# Patient Record
Sex: MALE | Race: WHITE | NOT HISPANIC OR LATINO | Employment: OTHER | URBAN - METROPOLITAN AREA
[De-identification: names, ages, dates, MRNs, and addresses within clinical notes are randomized per-mention and may not be internally consistent; named-entity substitution may affect disease eponyms.]

---

## 2017-05-23 ENCOUNTER — HISTORICAL (OUTPATIENT)
Dept: ADMINISTRATIVE | Facility: HOSPITAL | Age: 62
End: 2017-05-23

## 2017-11-21 ENCOUNTER — HISTORICAL (OUTPATIENT)
Dept: ADMINISTRATIVE | Facility: HOSPITAL | Age: 62
End: 2017-11-21

## 2017-12-12 ENCOUNTER — HISTORICAL (OUTPATIENT)
Dept: ADMINISTRATIVE | Facility: HOSPITAL | Age: 62
End: 2017-12-12

## 2017-12-12 ENCOUNTER — HISTORICAL (OUTPATIENT)
Dept: LAB | Facility: HOSPITAL | Age: 62
End: 2017-12-12

## 2017-12-13 LAB — GRAM STN SPEC: NORMAL

## 2017-12-17 LAB — FINAL CULTURE: NORMAL

## 2018-01-05 ENCOUNTER — HISTORICAL (OUTPATIENT)
Dept: ADMINISTRATIVE | Facility: HOSPITAL | Age: 63
End: 2018-01-05

## 2018-01-05 LAB
ALBUMIN SERPL-MCNC: 3.4 GM/DL (ref 3.4–5)
ALBUMIN/GLOB SERPL: 0.9 RATIO (ref 1.1–2)
ALP SERPL-CCNC: 93 UNIT/L (ref 50–136)
ALT SERPL-CCNC: 51 UNIT/L (ref 12–78)
AST SERPL-CCNC: 45 UNIT/L (ref 15–37)
BILIRUB SERPL-MCNC: 0.7 MG/DL (ref 0.2–1)
BILIRUBIN DIRECT+TOT PNL SERPL-MCNC: 0.2 MG/DL (ref 0–0.5)
BILIRUBIN DIRECT+TOT PNL SERPL-MCNC: 0.5 MG/DL (ref 0–0.8)
BUN SERPL-MCNC: 8 MG/DL (ref 7–18)
CALCIUM SERPL-MCNC: 9.2 MG/DL (ref 8.5–10.1)
CHLORIDE SERPL-SCNC: 105 MMOL/L (ref 98–107)
CHOLEST SERPL-MCNC: 131 MG/DL (ref 0–200)
CHOLEST/HDLC SERPL: 2.7 {RATIO} (ref 0–5)
CO2 SERPL-SCNC: 26 MMOL/L (ref 21–32)
CREAT SERPL-MCNC: 0.7 MG/DL (ref 0.7–1.3)
GLOBULIN SER-MCNC: 3.6 GM/DL (ref 2.4–3.5)
GLUCOSE SERPL-MCNC: 95 MG/DL (ref 74–106)
HDLC SERPL-MCNC: 49 MG/DL (ref 35–60)
LDLC SERPL CALC-MCNC: 59 MG/DL (ref 0–129)
POTASSIUM SERPL-SCNC: 4.3 MMOL/L (ref 3.5–5.1)
PROT SERPL-MCNC: 7 GM/DL (ref 6.4–8.2)
SODIUM SERPL-SCNC: 138 MMOL/L (ref 136–145)
TRIGL SERPL-MCNC: 115 MG/DL (ref 30–150)
VLDLC SERPL CALC-MCNC: 23 MG/DL

## 2018-03-14 ENCOUNTER — HISTORICAL (OUTPATIENT)
Dept: ADMINISTRATIVE | Facility: HOSPITAL | Age: 63
End: 2018-03-14

## 2018-03-14 LAB
ABS NEUT (OLG): 3.18 X10(3)/MCL (ref 2.1–9.2)
APPEARANCE, UA: CLEAR
APTT PPP: 28.6 SECOND(S) (ref 24.8–36.9)
BACTERIA SPEC CULT: ABNORMAL /HPF
BASOPHILS # BLD AUTO: 0 X10(3)/MCL (ref 0–0.2)
BASOPHILS NFR BLD AUTO: 1 %
BILIRUB UR QL STRIP: ABNORMAL
BUN SERPL-MCNC: 11 MG/DL (ref 7–18)
CALCIUM SERPL-MCNC: 9.7 MG/DL (ref 8.5–10.1)
CHLORIDE SERPL-SCNC: 102 MMOL/L (ref 98–107)
CO2 SERPL-SCNC: 26 MMOL/L (ref 21–32)
COLOR UR: ABNORMAL
CREAT SERPL-MCNC: 0.74 MG/DL (ref 0.7–1.3)
CREAT/UREA NIT SERPL: 14.9
EOSINOPHIL # BLD AUTO: 0.7 X10(3)/MCL (ref 0–0.9)
EOSINOPHIL NFR BLD AUTO: 12 %
ERYTHROCYTE [DISTWIDTH] IN BLOOD BY AUTOMATED COUNT: 14.4 % (ref 11.5–17)
GLUCOSE (UA): NEGATIVE
GLUCOSE SERPL-MCNC: 94 MG/DL (ref 74–106)
HCT VFR BLD AUTO: 41.1 % (ref 42–52)
HGB BLD-MCNC: 13.9 GM/DL (ref 14–18)
HGB UR QL STRIP: ABNORMAL
INR PPP: 1.09 (ref 0–1.27)
KETONES UR QL STRIP: ABNORMAL
LEUKOCYTE ESTERASE UR QL STRIP: NEGATIVE
LYMPHOCYTES # BLD AUTO: 1.3 X10(3)/MCL (ref 0.6–4.6)
LYMPHOCYTES NFR BLD AUTO: 22 %
MCH RBC QN AUTO: 32.9 PG (ref 27–31)
MCHC RBC AUTO-ENTMCNC: 33.8 GM/DL (ref 33–36)
MCV RBC AUTO: 97.2 FL (ref 80–94)
MONOCYTES # BLD AUTO: 0.8 X10(3)/MCL (ref 0.1–1.3)
MONOCYTES NFR BLD AUTO: 13 %
NEUTROPHILS # BLD AUTO: 3.18 X10(3)/MCL (ref 2.1–9.2)
NEUTROPHILS NFR BLD AUTO: 53 %
NITRITE UR QL STRIP: NEGATIVE
PH UR STRIP: 6 [PH] (ref 5–9)
PLATELET # BLD AUTO: 235 X10(3)/MCL (ref 130–400)
PMV BLD AUTO: 10.4 FL (ref 9.4–12.4)
POTASSIUM SERPL-SCNC: 4.2 MMOL/L (ref 3.5–5.1)
PROT UR QL STRIP: ABNORMAL
PROTHROMBIN TIME: 14.5 SECOND(S) (ref 12.2–14.7)
RBC # BLD AUTO: 4.23 X10(6)/MCL (ref 4.7–6.1)
RBC #/AREA URNS HPF: ABNORMAL /[HPF]
SODIUM SERPL-SCNC: 137 MMOL/L (ref 136–145)
SP GR UR STRIP: 1.02 (ref 1–1.03)
SQUAMOUS EPITHELIAL, UA: ABNORMAL
UROBILINOGEN UR STRIP-ACNC: 0.2
WBC # SPEC AUTO: 6 X10(3)/MCL (ref 4.5–11.5)
WBC #/AREA URNS HPF: ABNORMAL /HPF

## 2018-04-10 ENCOUNTER — HISTORICAL (OUTPATIENT)
Dept: ADMINISTRATIVE | Facility: HOSPITAL | Age: 63
End: 2018-04-10

## 2018-06-19 ENCOUNTER — HISTORICAL (OUTPATIENT)
Dept: ADMINISTRATIVE | Facility: HOSPITAL | Age: 63
End: 2018-06-19

## 2018-06-19 LAB
ABS NEUT (OLG): 3.47 X10(3)/MCL (ref 2.1–9.2)
ALBUMIN SERPL-MCNC: 3.7 GM/DL (ref 3.4–5)
ALBUMIN/GLOB SERPL: 0.9 {RATIO}
ALP SERPL-CCNC: 140 UNIT/L (ref 50–136)
ALT SERPL-CCNC: 37 UNIT/L (ref 12–78)
APPEARANCE, UA: CLEAR
APTT PPP: 28.3 SECOND(S) (ref 24.8–36.9)
AST SERPL-CCNC: 33 UNIT/L (ref 15–37)
BACTERIA SPEC CULT: ABNORMAL /HPF
BASOPHILS # BLD AUTO: 0.1 X10(3)/MCL (ref 0–0.2)
BASOPHILS NFR BLD AUTO: 1 %
BILIRUB SERPL-MCNC: 0.6 MG/DL (ref 0.2–1)
BILIRUB UR QL STRIP: NEGATIVE
BILIRUBIN DIRECT+TOT PNL SERPL-MCNC: 0.2 MG/DL (ref 0–0.2)
BILIRUBIN DIRECT+TOT PNL SERPL-MCNC: 0.4 MG/DL (ref 0–0.8)
BUN SERPL-MCNC: 19 MG/DL (ref 7–18)
CALCIUM SERPL-MCNC: 9.4 MG/DL (ref 8.5–10.1)
CHLORIDE SERPL-SCNC: 102 MMOL/L (ref 98–107)
CO2 SERPL-SCNC: 28 MMOL/L (ref 21–32)
COLOR UR: YELLOW
CREAT SERPL-MCNC: 1.05 MG/DL (ref 0.7–1.3)
EOSINOPHIL # BLD AUTO: 0.6 X10(3)/MCL (ref 0–0.9)
EOSINOPHIL NFR BLD AUTO: 10 %
ERYTHROCYTE [DISTWIDTH] IN BLOOD BY AUTOMATED COUNT: 13.2 % (ref 11.5–17)
GLOBULIN SER-MCNC: 4.3 GM/DL (ref 2.4–3.5)
GLUCOSE (UA): NEGATIVE
GLUCOSE SERPL-MCNC: 106 MG/DL (ref 74–106)
HCT VFR BLD AUTO: 36.6 % (ref 42–52)
HGB BLD-MCNC: 12.4 GM/DL (ref 14–18)
HGB UR QL STRIP: NEGATIVE
INR PPP: 1.06 (ref 0–1.27)
KETONES UR QL STRIP: NEGATIVE
LEUKOCYTE ESTERASE UR QL STRIP: NEGATIVE
LYMPHOCYTES # BLD AUTO: 1.1 X10(3)/MCL (ref 0.6–4.6)
LYMPHOCYTES NFR BLD AUTO: 19 %
MCH RBC QN AUTO: 33.2 PG (ref 27–31)
MCHC RBC AUTO-ENTMCNC: 33.9 GM/DL (ref 33–36)
MCV RBC AUTO: 98.1 FL (ref 80–94)
MONOCYTES # BLD AUTO: 0.7 X10(3)/MCL (ref 0.1–1.3)
MONOCYTES NFR BLD AUTO: 11 %
NEUTROPHILS # BLD AUTO: 3.47 X10(3)/MCL (ref 1.4–7.9)
NEUTROPHILS NFR BLD AUTO: 58 %
NITRITE UR QL STRIP: NEGATIVE
PH UR STRIP: 6 [PH] (ref 5–9)
PLATELET # BLD AUTO: 265 X10(3)/MCL (ref 130–400)
PMV BLD AUTO: 9.2 FL (ref 9.4–12.4)
POTASSIUM SERPL-SCNC: 5.1 MMOL/L (ref 3.5–5.1)
PROT SERPL-MCNC: 8 GM/DL (ref 6.4–8.2)
PROT UR QL STRIP: ABNORMAL
PROTHROMBIN TIME: 14.1 SECOND(S) (ref 12.2–14.7)
RBC # BLD AUTO: 3.73 X10(6)/MCL (ref 4.7–6.1)
RBC #/AREA URNS HPF: ABNORMAL /[HPF]
SODIUM SERPL-SCNC: 140 MMOL/L (ref 136–145)
SP GR UR STRIP: 1.01 (ref 1–1.03)
SQUAMOUS EPITHELIAL, UA: ABNORMAL
UROBILINOGEN UR STRIP-ACNC: 0.2
WBC # SPEC AUTO: 6 X10(3)/MCL (ref 4.5–11.5)
WBC #/AREA URNS HPF: ABNORMAL /HPF

## 2018-08-02 ENCOUNTER — HISTORICAL (OUTPATIENT)
Dept: ADMINISTRATIVE | Facility: HOSPITAL | Age: 63
End: 2018-08-02

## 2018-12-10 ENCOUNTER — HISTORICAL (OUTPATIENT)
Dept: LAB | Facility: HOSPITAL | Age: 63
End: 2018-12-10

## 2018-12-13 LAB — FINAL CULTURE: NORMAL

## 2019-01-28 ENCOUNTER — HISTORICAL (OUTPATIENT)
Dept: ADMINISTRATIVE | Facility: HOSPITAL | Age: 64
End: 2019-01-28

## 2019-01-28 LAB
ABS NEUT (OLG): 2.45 X10(3)/MCL (ref 2.1–9.2)
ALBUMIN SERPL-MCNC: 3.7 GM/DL (ref 3.4–5)
ALBUMIN/GLOB SERPL: 0.9 RATIO (ref 1.1–2)
ALP SERPL-CCNC: 120 UNIT/L (ref 50–136)
ALT SERPL-CCNC: 62 UNIT/L (ref 12–78)
ANISOCYTOSIS BLD QL SMEAR: 0
APPEARANCE, UA: CLEAR
APTT PPP: 30.7 SECOND(S) (ref 24.8–36.9)
AST SERPL-CCNC: 51 UNIT/L (ref 15–37)
BACTERIA SPEC CULT: NORMAL /HPF
BILIRUB SERPL-MCNC: 0.5 MG/DL (ref 0.2–1)
BILIRUB UR QL STRIP: NEGATIVE
BILIRUBIN DIRECT+TOT PNL SERPL-MCNC: 0.1 MG/DL (ref 0–0.5)
BILIRUBIN DIRECT+TOT PNL SERPL-MCNC: 0.4 MG/DL (ref 0–0.8)
BUN SERPL-MCNC: 35 MG/DL (ref 7–18)
CALCIUM SERPL-MCNC: 9.6 MG/DL (ref 8.5–10.1)
CHLORIDE SERPL-SCNC: 105 MMOL/L (ref 98–107)
CHOLEST SERPL-MCNC: 166 MG/DL (ref 0–200)
CHOLEST/HDLC SERPL: 2.4 {RATIO} (ref 0–5)
CO2 SERPL-SCNC: 25 MMOL/L (ref 21–32)
COLOR UR: YELLOW
CREAT SERPL-MCNC: 1.43 MG/DL (ref 0.7–1.3)
EOSINOPHIL NFR BLD MANUAL: 11 % (ref 0–8)
ERYTHROCYTE [DISTWIDTH] IN BLOOD BY AUTOMATED COUNT: 11.9 % (ref 11.5–17)
GLOBULIN SER-MCNC: 4.2 GM/DL (ref 2.4–3.5)
GLUCOSE (UA): NEGATIVE
GLUCOSE SERPL-MCNC: 107 MG/DL (ref 74–106)
HCT VFR BLD AUTO: 35.2 % (ref 42–52)
HDLC SERPL-MCNC: 68 MG/DL (ref 35–60)
HGB BLD-MCNC: 11.6 GM/DL (ref 14–18)
HGB UR QL STRIP: NEGATIVE
HYPOCHROMIA BLD QL SMEAR: 0
INR PPP: 1.1 (ref 0–1.3)
KETONES UR QL STRIP: NEGATIVE
LDLC SERPL CALC-MCNC: 75 MG/DL (ref 0–129)
LEUKOCYTE ESTERASE UR QL STRIP: NEGATIVE
LYMPHOCYTES NFR BLD MANUAL: 13 % (ref 13–40)
MACROCYTES BLD QL SMEAR: 0
MCH RBC QN AUTO: 33.3 PG (ref 27–31)
MCHC RBC AUTO-ENTMCNC: 33 GM/DL (ref 33–36)
MCV RBC AUTO: 101.1 FL (ref 80–94)
MICROCYTES BLD QL SMEAR: 0
MONOCYTES NFR BLD MANUAL: 10 % (ref 2–11)
NEUTROPHILS NFR BLD MANUAL: 65 % (ref 47–80)
NITRITE UR QL STRIP: NEGATIVE
PH UR STRIP: 5 [PH] (ref 5–9)
PLATELET # BLD AUTO: 227 X10(3)/MCL (ref 130–400)
PLATELET # BLD EST: NORMAL 10*3/UL
PMV BLD AUTO: 9.7 FL (ref 7.4–10.4)
POIKILOCYTOSIS BLD QL SMEAR: 0
POLYCHROMASIA BLD QL SMEAR: 0
POTASSIUM SERPL-SCNC: 4.9 MMOL/L (ref 3.5–5.1)
PROT SERPL-MCNC: 7.9 GM/DL (ref 6.4–8.2)
PROT UR QL STRIP: NEGATIVE
PROTHROMBIN TIME: 14.7 SECOND(S) (ref 12.2–14.7)
PSA SERPL-MCNC: 1.47 NG/ML (ref 0–4)
RBC # BLD AUTO: 3.48 X10(6)/MCL (ref 4.7–6.1)
RBC #/AREA URNS HPF: NORMAL /[HPF]
SODIUM SERPL-SCNC: 137 MMOL/L (ref 136–145)
SP GR UR STRIP: 1.01 (ref 1–1.03)
SQUAMOUS EPITHELIAL, UA: NORMAL
TRIGL SERPL-MCNC: 115 MG/DL (ref 30–150)
UROBILINOGEN UR STRIP-ACNC: 0.2
VLDLC SERPL CALC-MCNC: 23 MG/DL
WBC # SPEC AUTO: 5.1 X10(3)/MCL (ref 4.5–11.5)
WBC #/AREA URNS HPF: NORMAL /HPF

## 2019-01-30 ENCOUNTER — HISTORICAL (OUTPATIENT)
Dept: ADMINISTRATIVE | Facility: HOSPITAL | Age: 64
End: 2019-01-30

## 2019-01-30 LAB
BUN SERPL-MCNC: 32 MG/DL (ref 7–18)
CALCIUM SERPL-MCNC: 8.8 MG/DL (ref 8.5–10.1)
CHLORIDE SERPL-SCNC: 102 MMOL/L (ref 98–107)
CO2 SERPL-SCNC: 24 MMOL/L (ref 21–32)
CREAT SERPL-MCNC: 1.31 MG/DL (ref 0.7–1.3)
CREAT/UREA NIT SERPL: 24.4
GLUCOSE SERPL-MCNC: 128 MG/DL (ref 74–106)
POTASSIUM SERPL-SCNC: 4.2 MMOL/L (ref 3.5–5.1)
SODIUM SERPL-SCNC: 134 MMOL/L (ref 136–145)

## 2019-04-23 ENCOUNTER — HISTORICAL (OUTPATIENT)
Dept: LAB | Facility: HOSPITAL | Age: 64
End: 2019-04-23

## 2019-04-23 LAB
ABS NEUT (OLG): 3.63 X10(3)/MCL (ref 2.1–9.2)
ALBUMIN SERPL-MCNC: 4.3 GM/DL (ref 3.4–5)
ALBUMIN/GLOB SERPL: 0.9 RATIO (ref 1.1–2)
ALP SERPL-CCNC: 157 UNIT/L (ref 50–136)
ALT SERPL-CCNC: 57 UNIT/L (ref 12–78)
APPEARANCE, UA: CLEAR
AST SERPL-CCNC: 59 UNIT/L (ref 15–37)
BACTERIA SPEC CULT: ABNORMAL /HPF
BASOPHILS NFR BLD MANUAL: 1 % (ref 0–2)
BILIRUB SERPL-MCNC: 0.6 MG/DL (ref 0.2–1)
BILIRUB UR QL STRIP: NEGATIVE
BILIRUBIN DIRECT+TOT PNL SERPL-MCNC: 0.2 MG/DL (ref 0–0.5)
BILIRUBIN DIRECT+TOT PNL SERPL-MCNC: 0.4 MG/DL (ref 0–0.8)
BUN SERPL-MCNC: 25 MG/DL (ref 7–18)
CALCIUM SERPL-MCNC: 9.6 MG/DL (ref 8.5–10.1)
CHLORIDE SERPL-SCNC: 102 MMOL/L (ref 98–107)
CO2 SERPL-SCNC: 26 MMOL/L (ref 21–32)
COLOR UR: YELLOW
CREAT SERPL-MCNC: 1.19 MG/DL (ref 0.7–1.3)
EOSINOPHIL NFR BLD MANUAL: 13 % (ref 0–8)
ERYTHROCYTE [DISTWIDTH] IN BLOOD BY AUTOMATED COUNT: 15.2 % (ref 11.5–17)
GLOBULIN SER-MCNC: 4.7 GM/DL (ref 2.4–3.5)
GLUCOSE (UA): NEGATIVE
GLUCOSE SERPL-MCNC: 84 MG/DL (ref 74–106)
HCT VFR BLD AUTO: 39.5 % (ref 42–52)
HGB BLD-MCNC: 12.3 GM/DL (ref 14–18)
HGB UR QL STRIP: NEGATIVE
KETONES UR QL STRIP: NEGATIVE
LEUKOCYTE ESTERASE UR QL STRIP: NEGATIVE
LYMPHOCYTES NFR BLD MANUAL: 13 % (ref 13–40)
MCH RBC QN AUTO: 30.9 PG (ref 27–31)
MCHC RBC AUTO-ENTMCNC: 31.1 GM/DL (ref 33–36)
MCV RBC AUTO: 99.2 FL (ref 80–94)
MONOCYTES NFR BLD MANUAL: 14 % (ref 2–11)
NEUTROPHILS NFR BLD MANUAL: 59 % (ref 47–80)
NITRITE UR QL STRIP: NEGATIVE
PH UR STRIP: 5.5 [PH] (ref 5–9)
PLATELET # BLD AUTO: 308 X10(3)/MCL (ref 130–400)
PLATELET # BLD EST: NORMAL 10*3/UL
PMV BLD AUTO: 10.5 FL (ref 7.4–10.4)
POTASSIUM SERPL-SCNC: 4.7 MMOL/L (ref 3.5–5.1)
PROT SERPL-MCNC: 9 GM/DL (ref 6.4–8.2)
PROT UR QL STRIP: ABNORMAL
RBC # BLD AUTO: 3.98 X10(6)/MCL (ref 4.7–6.1)
RBC #/AREA URNS HPF: ABNORMAL /[HPF]
SODIUM SERPL-SCNC: 135 MMOL/L (ref 136–145)
SP GR UR STRIP: 1.01 (ref 1–1.03)
SQUAMOUS EPITHELIAL, UA: ABNORMAL
TSH SERPL-ACNC: 1.43 MIU/L (ref 0.36–3.74)
UROBILINOGEN UR STRIP-ACNC: 0.2
WBC # SPEC AUTO: 6.9 X10(3)/MCL (ref 4.5–11.5)
WBC #/AREA URNS HPF: ABNORMAL /HPF

## 2019-04-26 ENCOUNTER — HISTORICAL (OUTPATIENT)
Dept: ADMINISTRATIVE | Facility: HOSPITAL | Age: 64
End: 2019-04-26

## 2019-04-26 LAB
D DIMER PPP IA.FEU-MCNC: 2101 NG/ML FEU (ref 0–500)
HAV IGM SERPL QL IA: NEGATIVE
HBV CORE IGM SERPL QL IA: NEGATIVE
HBV SURFACE AG SERPL QL IA: NEGATIVE
HCV AB SERPL QL IA: NEGATIVE
HEPATITIS PANEL INTERP: NORMAL

## 2019-08-13 ENCOUNTER — HISTORICAL (OUTPATIENT)
Dept: ADMINISTRATIVE | Facility: HOSPITAL | Age: 64
End: 2019-08-13

## 2020-05-21 ENCOUNTER — HISTORICAL (OUTPATIENT)
Dept: ADMINISTRATIVE | Facility: HOSPITAL | Age: 65
End: 2020-05-21

## 2020-05-21 LAB
ABS NEUT (OLG): 3.04 X10(3)/MCL (ref 2.1–9.2)
ALBUMIN SERPL-MCNC: 3.9 GM/DL (ref 3.4–4.8)
ALBUMIN/GLOB SERPL: 1.2 RATIO (ref 1.1–2)
ALP SERPL-CCNC: 91 UNIT/L (ref 40–150)
ALT SERPL-CCNC: 23 UNIT/L (ref 0–55)
AST SERPL-CCNC: 33 UNIT/L (ref 5–34)
BASOPHILS # BLD AUTO: 0 X10(3)/MCL (ref 0–0.2)
BASOPHILS NFR BLD AUTO: 1 %
BILIRUB SERPL-MCNC: 0.5 MG/DL
BILIRUBIN DIRECT+TOT PNL SERPL-MCNC: 0.2 MG/DL (ref 0–0.5)
BILIRUBIN DIRECT+TOT PNL SERPL-MCNC: 0.3 MG/DL (ref 0–0.8)
BUN SERPL-MCNC: 16.3 MG/DL (ref 8.4–25.7)
CALCIUM SERPL-MCNC: 9.2 MG/DL (ref 8.8–10)
CHLORIDE SERPL-SCNC: 101 MMOL/L (ref 98–107)
CHOLEST SERPL-MCNC: 172 MG/DL
CHOLEST/HDLC SERPL: 3 {RATIO} (ref 0–5)
CO2 SERPL-SCNC: 23 MMOL/L (ref 23–31)
CREAT SERPL-MCNC: 0.96 MG/DL (ref 0.73–1.18)
EOSINOPHIL # BLD AUTO: 0.4 X10(3)/MCL (ref 0–0.9)
EOSINOPHIL NFR BLD AUTO: 9 %
ERYTHROCYTE [DISTWIDTH] IN BLOOD BY AUTOMATED COUNT: 12.3 % (ref 11.5–17)
GLOBULIN SER-MCNC: 3.3 GM/DL (ref 2.4–3.5)
GLUCOSE SERPL-MCNC: 98 MG/DL (ref 82–115)
HCT VFR BLD AUTO: 31.6 % (ref 42–52)
HDLC SERPL-MCNC: 59 MG/DL (ref 35–60)
HGB BLD-MCNC: 10.5 GM/DL (ref 14–18)
LDLC SERPL CALC-MCNC: 103 MG/DL (ref 50–140)
LYMPHOCYTES # BLD AUTO: 0.8 X10(3)/MCL (ref 0.6–4.6)
LYMPHOCYTES NFR BLD AUTO: 17 %
MCH RBC QN AUTO: 31.3 PG (ref 27–31)
MCHC RBC AUTO-ENTMCNC: 33.2 GM/DL (ref 33–36)
MCV RBC AUTO: 94 FL (ref 80–94)
MONOCYTES # BLD AUTO: 0.7 X10(3)/MCL (ref 0.1–1.3)
MONOCYTES NFR BLD AUTO: 14 %
NEUTROPHILS # BLD AUTO: 3.04 X10(3)/MCL (ref 2.1–9.2)
NEUTROPHILS NFR BLD AUTO: 60 %
PLATELET # BLD AUTO: 318 X10(3)/MCL (ref 130–400)
PMV BLD AUTO: 9.5 FL (ref 9.4–12.4)
POTASSIUM SERPL-SCNC: 4.7 MMOL/L (ref 3.5–5.1)
PROT SERPL-MCNC: 7.2 GM/DL (ref 5.8–7.6)
PSA SERPL-MCNC: 0.89 NG/ML
RBC # BLD AUTO: 3.36 X10(6)/MCL (ref 4.7–6.1)
SODIUM SERPL-SCNC: 131 MMOL/L (ref 136–145)
TRIGL SERPL-MCNC: 52 MG/DL (ref 34–140)
VLDLC SERPL CALC-MCNC: 10 MG/DL
WBC # SPEC AUTO: 5.1 X10(3)/MCL (ref 4.5–11.5)

## 2020-05-28 ENCOUNTER — HISTORICAL (OUTPATIENT)
Dept: ADMINISTRATIVE | Facility: HOSPITAL | Age: 65
End: 2020-05-28

## 2020-05-28 LAB
ABS NEUT (OLG): 4.18 X10(3)/MCL (ref 2.1–9.2)
BASOPHILS # BLD AUTO: 0 X10(3)/MCL (ref 0–0.2)
BASOPHILS NFR BLD AUTO: 1 %
BUN SERPL-MCNC: 12.3 MG/DL (ref 8.4–25.7)
CALCIUM SERPL-MCNC: 9.3 MG/DL (ref 8.8–10)
CHLORIDE SERPL-SCNC: 95 MMOL/L (ref 98–107)
CO2 SERPL-SCNC: 25 MMOL/L (ref 23–31)
CREAT SERPL-MCNC: 0.83 MG/DL (ref 0.73–1.18)
CREAT/UREA NIT SERPL: 15
EOSINOPHIL # BLD AUTO: 0.4 X10(3)/MCL (ref 0–0.9)
EOSINOPHIL NFR BLD AUTO: 6 %
ERYTHROCYTE [DISTWIDTH] IN BLOOD BY AUTOMATED COUNT: 12.5 % (ref 11.5–17)
FOLATE SERPL-MCNC: 5 NG/ML (ref 7–31.4)
GLUCOSE SERPL-MCNC: 80 MG/DL (ref 82–115)
HAPTOGLOB SERPL-MCNC: 180 MG/DL (ref 40–368)
HCT VFR BLD AUTO: 32 % (ref 42–52)
HGB BLD-MCNC: 10.6 GM/DL (ref 14–18)
LYMPHOCYTES # BLD AUTO: 1 X10(3)/MCL (ref 0.6–4.6)
LYMPHOCYTES NFR BLD AUTO: 15 %
MCH RBC QN AUTO: 30.5 PG (ref 27–31)
MCHC RBC AUTO-ENTMCNC: 33.1 GM/DL (ref 33–36)
MCV RBC AUTO: 92.2 FL (ref 80–94)
MONOCYTES # BLD AUTO: 0.8 X10(3)/MCL (ref 0.1–1.3)
MONOCYTES NFR BLD AUTO: 12 %
NEUTROPHILS # BLD AUTO: 4.18 X10(3)/MCL (ref 2.1–9.2)
NEUTROPHILS NFR BLD AUTO: 66 %
PLATELET # BLD AUTO: 365 X10(3)/MCL (ref 130–400)
PMV BLD AUTO: 10 FL (ref 9.4–12.4)
POTASSIUM SERPL-SCNC: 4.7 MMOL/L (ref 3.5–5.1)
RBC # BLD AUTO: 3.47 X10(6)/MCL (ref 4.7–6.1)
RET# (OHS): 0.06 X10^6/ML (ref 0.03–0.1)
RETICULOCYTE COUNT AUTOMATED (OLG): 1.5 % (ref 1.1–2.1)
SODIUM SERPL-SCNC: 128 MMOL/L (ref 136–145)
VIT B12 SERPL-MCNC: >2000 PG/ML (ref 213–816)
WBC # SPEC AUTO: 6.4 X10(3)/MCL (ref 4.5–11.5)

## 2020-07-27 ENCOUNTER — HISTORICAL (OUTPATIENT)
Dept: ADMINISTRATIVE | Facility: HOSPITAL | Age: 65
End: 2020-07-27

## 2020-07-27 LAB
ABS NEUT (OLG): 3.48 X10(3)/MCL (ref 2.1–9.2)
BASOPHILS # BLD AUTO: 0 X10(3)/MCL (ref 0–0.2)
BASOPHILS NFR BLD AUTO: 1 %
BUN SERPL-MCNC: 22.3 MG/DL (ref 8.4–25.7)
CALCIUM SERPL-MCNC: 9.5 MG/DL (ref 8.8–10)
CHLORIDE SERPL-SCNC: 105 MMOL/L (ref 98–107)
CO2 SERPL-SCNC: 27 MMOL/L (ref 23–31)
CREAT SERPL-MCNC: 1.05 MG/DL (ref 0.73–1.18)
CREAT/UREA NIT SERPL: 21
EOSINOPHIL # BLD AUTO: 0.6 X10(3)/MCL (ref 0–0.9)
EOSINOPHIL NFR BLD AUTO: 11 %
ERYTHROCYTE [DISTWIDTH] IN BLOOD BY AUTOMATED COUNT: 13.8 % (ref 11.5–17)
FOLATE SERPL-MCNC: 16.8 NG/ML (ref 7–31.4)
GLUCOSE SERPL-MCNC: 96 MG/DL (ref 82–115)
HCT VFR BLD AUTO: 33.8 % (ref 42–52)
HGB BLD-MCNC: 10.8 GM/DL (ref 14–18)
LYMPHOCYTES # BLD AUTO: 0.9 X10(3)/MCL (ref 0.6–4.6)
LYMPHOCYTES NFR BLD AUTO: 15 %
MCH RBC QN AUTO: 30.3 PG (ref 27–31)
MCHC RBC AUTO-ENTMCNC: 32 GM/DL (ref 33–36)
MCV RBC AUTO: 94.9 FL (ref 80–94)
MONOCYTES # BLD AUTO: 0.6 X10(3)/MCL (ref 0.1–1.3)
MONOCYTES NFR BLD AUTO: 11 %
NEUTROPHILS # BLD AUTO: 3.48 X10(3)/MCL (ref 2.1–9.2)
NEUTROPHILS NFR BLD AUTO: 61 %
PLATELET # BLD AUTO: 234 X10(3)/MCL (ref 130–400)
PMV BLD AUTO: 9.2 FL (ref 9.4–12.4)
POTASSIUM SERPL-SCNC: 5 MMOL/L (ref 3.5–5.1)
RBC # BLD AUTO: 3.56 X10(6)/MCL (ref 4.7–6.1)
SODIUM SERPL-SCNC: 137 MMOL/L (ref 136–145)
WBC # SPEC AUTO: 5.7 X10(3)/MCL (ref 4.5–11.5)

## 2020-11-19 ENCOUNTER — HISTORICAL (OUTPATIENT)
Dept: ADMINISTRATIVE | Facility: HOSPITAL | Age: 65
End: 2020-11-19

## 2021-03-01 ENCOUNTER — HISTORICAL (OUTPATIENT)
Dept: ADMINISTRATIVE | Facility: HOSPITAL | Age: 66
End: 2021-03-01

## 2021-03-01 LAB
ABS NEUT (OLG): 4.33 X10(3)/MCL (ref 2.1–9.2)
BASOPHILS # BLD AUTO: 0.1 X10(3)/MCL (ref 0–0.2)
BASOPHILS NFR BLD AUTO: 1 %
BUN SERPL-MCNC: 22.9 MG/DL (ref 8.4–25.7)
CALCIUM SERPL-MCNC: 9.3 MG/DL (ref 8.8–10)
CHLORIDE SERPL-SCNC: 99 MMOL/L (ref 98–107)
CO2 SERPL-SCNC: 22 MMOL/L (ref 23–31)
CREAT SERPL-MCNC: 0.99 MG/DL (ref 0.73–1.18)
CREAT/UREA NIT SERPL: 23
EOSINOPHIL # BLD AUTO: 0.5 X10(3)/MCL (ref 0–0.9)
EOSINOPHIL NFR BLD AUTO: 8 %
ERYTHROCYTE [DISTWIDTH] IN BLOOD BY AUTOMATED COUNT: 12.6 % (ref 11.5–17)
GLUCOSE SERPL-MCNC: 89 MG/DL (ref 82–115)
HCT VFR BLD AUTO: 32.2 % (ref 42–52)
HGB BLD-MCNC: 10.9 GM/DL (ref 14–18)
LYMPHOCYTES # BLD AUTO: 0.9 X10(3)/MCL (ref 0.6–4.6)
LYMPHOCYTES NFR BLD AUTO: 13 %
MCH RBC QN AUTO: 33.7 PG (ref 27–31)
MCHC RBC AUTO-ENTMCNC: 33.9 GM/DL (ref 33–36)
MCV RBC AUTO: 99.7 FL (ref 80–94)
MONOCYTES # BLD AUTO: 1 X10(3)/MCL (ref 0.1–1.3)
MONOCYTES NFR BLD AUTO: 14 %
NEUTROPHILS # BLD AUTO: 4.33 X10(3)/MCL (ref 2.1–9.2)
NEUTROPHILS NFR BLD AUTO: 64 %
PLATELET # BLD AUTO: 279 X10(3)/MCL (ref 130–400)
PMV BLD AUTO: 10.7 FL (ref 9.4–12.4)
POTASSIUM SERPL-SCNC: 5.1 MMOL/L (ref 3.5–5.1)
RBC # BLD AUTO: 3.23 X10(6)/MCL (ref 4.7–6.1)
SODIUM SERPL-SCNC: 131 MMOL/L (ref 136–145)
WBC # SPEC AUTO: 6.8 X10(3)/MCL (ref 4.5–11.5)

## 2021-04-13 ENCOUNTER — HISTORICAL (OUTPATIENT)
Dept: ADMINISTRATIVE | Facility: HOSPITAL | Age: 66
End: 2021-04-13

## 2021-07-13 ENCOUNTER — HISTORICAL (OUTPATIENT)
Dept: ADMINISTRATIVE | Facility: HOSPITAL | Age: 66
End: 2021-07-13

## 2021-07-13 LAB
ALBUMIN SERPL-MCNC: 3.8 GM/DL (ref 3.4–4.8)
ALBUMIN/GLOB SERPL: 1 RATIO (ref 1.1–2)
ALP SERPL-CCNC: 74 UNIT/L (ref 40–150)
ALT SERPL-CCNC: 63 UNIT/L (ref 0–55)
AST SERPL-CCNC: 54 UNIT/L (ref 5–34)
BILIRUB SERPL-MCNC: 0.6 MG/DL
BILIRUBIN DIRECT+TOT PNL SERPL-MCNC: 0.3 MG/DL (ref 0–0.5)
BILIRUBIN DIRECT+TOT PNL SERPL-MCNC: 0.3 MG/DL (ref 0–0.8)
BUN SERPL-MCNC: 17.5 MG/DL (ref 8.4–25.7)
CALCIUM SERPL-MCNC: 9.9 MG/DL (ref 8.8–10)
CHLORIDE SERPL-SCNC: 100 MMOL/L (ref 98–107)
CHOLEST SERPL-MCNC: 202 MG/DL
CHOLEST/HDLC SERPL: 2 {RATIO} (ref 0–5)
CO2 SERPL-SCNC: 24 MMOL/L (ref 23–31)
CREAT SERPL-MCNC: 1.32 MG/DL (ref 0.73–1.18)
GLOBULIN SER-MCNC: 3.8 GM/DL (ref 2.4–3.5)
GLUCOSE SERPL-MCNC: 96 MG/DL (ref 82–115)
HDLC SERPL-MCNC: 106 MG/DL (ref 35–60)
LDLC SERPL CALC-MCNC: 82 MG/DL (ref 50–140)
POTASSIUM SERPL-SCNC: 5 MMOL/L (ref 3.5–5.1)
PROT SERPL-MCNC: 7.6 GM/DL (ref 5.8–7.6)
PSA SERPL-MCNC: 1.51 NG/ML
SODIUM SERPL-SCNC: 134 MMOL/L (ref 136–145)
TESTOST SERPL-MCNC: 573.13 NG/DL (ref 220.91–715.81)
TRIGL SERPL-MCNC: 70 MG/DL (ref 34–140)
VLDLC SERPL CALC-MCNC: 14 MG/DL

## 2022-04-11 ENCOUNTER — HISTORICAL (OUTPATIENT)
Dept: ADMINISTRATIVE | Facility: HOSPITAL | Age: 67
End: 2022-04-11

## 2022-04-24 VITALS
SYSTOLIC BLOOD PRESSURE: 150 MMHG | DIASTOLIC BLOOD PRESSURE: 84 MMHG | HEIGHT: 73 IN | OXYGEN SATURATION: 99 % | WEIGHT: 218 LBS | BODY MASS INDEX: 28.89 KG/M2

## 2022-05-04 NOTE — HISTORICAL [facility]ER
This is a historical note converted from Joey. Formatting and pictures may have been removed.  Please reference Joey for original formatting and attached multimedia. Chief Complaint  Patient here with Left knee pain. States its been hurting for about 1 week with some swelling. Patient also states he is having some left hip and low back pain  History of Present Illness  ????  ? Pain getting WORST? Pain left Shoulder laterally ? No fever ? No chills  ? Pain in the left shoulder in the subacromial region ? In the supraspinatus region ? When actively and passively moved ? ? Started gradually ? Slowly worsens with extended activity ? Worsens at night ? Causing an inability to sleep ? Causes difficulty lying on affected side ? Feels better after rest ? Not relieved by medication ? Shoulder joint stiffness on the left ? joint stiffness ? ?? No radicular arm pain ? No left sternoclavicular joint pain ? No left shoulder joint swelling ? No pain in the acromioclavicular  ? No tingling of the left shoulder ? No left shoulder numbness  ? Range of motion was abnormal difficulty reaching over head using left arm ? Range of motion was abnormal difficulty combing hair using left arm ? Range of motion was abnormal difficulty taking shirt on/off using left arm  ? No neck pain on left Precervical pain ? Not in the trapezius Trapezius pain  Pain is significantly effecting quality of life  ?  ?? Lower back pain getting worse ?? Lower back pain Frequently (75% of the time)  ?? No changes in urinary habits ?? No leg weakness ?? No difficulty with balance ?? No tingling of the legs ?? No burning sensation in the right leg or foot ?? Not in the left leg or foot ?? No numbness of the right leg ??  Pain is significantly effecting quality of life  Review of Systems  Review of Systems  ????Constitutional: No fever, No chills.  ????Respiratory: No shortness of breath, No cough.  ????Cardiovascular: No chest pain.  ????Gastrointestinal: No  nausea, No vomiting, No diarrhea, No constipation, No heartburn.  ????Genitourinary: No dysuria, No hematuria.  ????Hematology/Lymphatics: No bleeding tendency.  ????Endocrine: No polyuria.  ????Neurologic: Alert and oriented X4, No numbness, No tingling.  ????Psychiatric: No anxiety, No depression.  Physical Exam  Vitals & Measurements  HR:?114?(Peripheral)? BP:?164/100?  HT:?185?cm? HT:?185?cm? WT:?102.5?kg? WT:?102.5?kg? BMI:?29.95?  PHYSICAL FINDINGS  ?   ???? Left Knee: ? Examined.? ? Positive effusion.? ?Localized swelling.? ?Genu varum.? ?Patella demonstrated crepitus.? ?Anteromedial aspect was tender on palpation.? ?Medial aspect was tender on palpation.? Patellofemoral compartment TTP  patella  Left Knee:  Knee Motion:? ?? ?? Value???????????  ? Active flexion????????? 125?degrees  Active extension???????? 05?degrees  ?   left knee ?? Pain was elicited throughout the range of motion.? ? Swelling with a?positive fluctuation test.? ? No erythema.? ? No warmth.? ? Anterior aspect was? tender on palpation.? ? Patellofemoral region was tender on palpation.? ? Medial collateral ligament was not tender on palpation.? ? Lateral collateral ligament was not tender on palpation.? ? No pain was elicited by motion using [place] apprehension test.? ? No laxity of the posterior cruciate ligament.? ? No anterior drawer sign was present.? ? No one plane medial (straight) instability.? ? No one plane lateral (straight) instability.? ? A Lachman test did not demonstrate one plane anterior instability.? ? Clarkes sign was? observed for chondromalacia.  ?  ?  ?  ?   TESTS  Imaging:  X-Ray Knee:  AP and lateral view x-rays of the left knee with sunrise view of the patella were performed -of left knee.  ?   IMPRESSIONS RADIOLOGY TEST  Narrowing of the left knee joint space bone on bone (patellofemoral compartment worse than medial), showed sclerosis of the left knee, and osteophytes arising from the left knee.  ?  ?  left knee  aspiration and injection  Administered corticosteroid injection?? ? 2cc cortisone and 2cc lidocaine using sterile technique after informed verbal consent. Risks discussed with patient prior to injection. Informed the patient of the following:  -?Explained to patient that this injection in some patients will experience increased pain a few minutes after the injection and that the pain will last anywhere from 10 to 20 minutes. In order to decrease the pain you need to do the following:  o?Make sure to move the extremity in which the injection was given. If you do not move the medication sits there and can cause more pain.  o?Ice the affected joint every 2 hours for 20 minutes at a time for the next 24 hours.  o?If you are not already taking an anti-inflammatory take the following Ibuprofen/ Advil take 3 to 4 tablets every 6 to 8 hours or 2 Aleve every 12 hours for the next 5 days to help the medication work. If you cannot take anti-inflammatory take 2 extra strength Tylenol every 6 hours for the next 5 days.  ??Patient voiced understanding ?????????????????????????????????????????????????????????????????????????????  ?  ?  ?  Cardiovascular:  Arterial Pulses: ?? Posterior tibialis pulses were normal. ?? Dorsalis pedis pulses were normal.  Musculoskeletal System:  Lumbar / Lumbosacral Spine:  General/bilateral: ?? Lumbosacral spine exhibited tenderness on palpation. ?? Lumbosacral spine pain was elicited by motion. ?? Lumbosacral spine exhibited no muscle spasms. ?? Lumbosacral spine motion was normal. ?? A straight-leg raising test of the right leg was negative. ?? A straight-leg raising test of the left leg was negative. ?? A modified straight-leg raising test of the right leg was negative (sitting). ?? A modified straight-leg raising test of the left leg was negative (sitting).  Buttocks:  General/bilateral: ?? Tenderness on palpation of the buttocks.  Hips:  General/bilateral: ?? Hip motion was  normal.  Knee:  General/bilateral: ?? Knee motion was normal.  Ankle (Motion):  General/bilateral: ?? Ankle motion was normal.  Neurological:  Sensation: ?? No decreased response to tactile stimulation of the entire leg right. ?? No decreased response to tactile stimulation of the entire leg left.  Strength:??????????????Value????Normal Range  Extension strength of the right first -?????????????? 5 out of 5????5 to 5  toe  Extension strength of the left first -?????????????? 5 out of 5????5 to 5  toe  Motor (Strength): ?? No hip weakness was observed. ?? No knee weakness was observed. ?? No ankle weakness was observed.  Gait And Stance: ?? Toe walking was normal.  Reflexes: ?? Knee jerk was normal. ?? Ankle jerk reflex was normal. ?? Flexor response.  Skin:  Injury / Incision Site: ?? No scar lumbar  ?  ?  x-rays of lumbar spine today  DDD multi-level more pronounced L4-5 and L5-S1 with extra-articular osteophytes  Assessment/Plan  1.?Osteoarthritis of left knee  Ordered:  betamethasone, 12 mg, Intra-Articular, Once, first dose 11/21/17 10:00:00 CST, stop date 11/21/17 10:00:00 CST, 24  Lidocaine inj., 2 mL, Intra-Articular, Once, first dose 11/21/17 9:26:00 CST, stop date 11/21/17 9:26:00 CST  asp/inj jnt/bursa, major [id number] , 11/21/17 9:26:00 CST, [facility], Routine, 11/21/17 9:26:00 CST  Clinic Follow up, *Est. 12/21/17 3:00:00 CST, Order for future visit, Osteoarthritis of left knee  DDD (degenerative disc disease), lumbar, [facility]  Office/Outpatient Visit Level 4 Established [id number] PC, Osteoarthritis of left knee  DDD (degenerative disc disease), lumbar, [facility], 11/21/17 9:26:00 CST  ?  2.?DDD (degenerative disc disease), lumbar  Ordered:  betamethasone, 12 mg, Intra-Articular, Once, first dose 11/21/17 10:00:00 CST, stop date 11/21/17 10:00:00 CST, 24  Lidocaine inj., 2 mL, Intra-Articular, Once, first dose 11/21/17 9:26:00 CST, stop date 11/21/17 9:26:00  CST  asp/inj jnt/bursa, major [id number] PC, 11/21/17 9:26:00 CST, [facility], Routine, 11/21/17 9:26:00 CST  Clinic Follow up, *Est. 12/21/17 3:00:00 CST, Order for future visit, Osteoarthritis of left knee  DDD (degenerative disc disease), lumbar, [facility]  Office/Outpatient Visit Level 4 Established [id number] PC, Osteoarthritis of left knee  DDD (degenerative disc disease), lumbar, [facility], 11/21/17 9:26:00 CST  ?  Left knee pain  Ordered:  XR Knee Left 3 Views, Routine, 11/21/17 8:39:00 CST, Pain, None, Ambulatory, Rad Type, Left knee pain, 11/21/17 8:39:00 CST  ?  Low back pain  Ordered:  XR Spine Lumbar 2 or 3 Views, Routine, 11/21/17 8:39:00 CST, Back Pain, None, Ambulatory, Rad Type, Low back pain, 11/21/17 8:39:00 CST  ?   Problem List/Past Medical History  Ongoing  DDD (degenerative disc disease), lumbar  High cholesterol  Osteoarthritis of left knee  Partial tear of right rotator cuff  Historical  Procedure/Surgical History  Arthroscopy of shoulder  Cervical spinal fusion  Decompression laminectomy  History of cervical spine fusion  Hx of knee surgery.  Incision AND drainage  LEFT SHOULDER DECOMPRESSION  LUMBAR SURGERY  RIGHT KNEE SURGER  Tonsillectomy  Medications  Aspir 81 oral delayed release tablet, 81 mg= 1 tab(s), Oral, Daily  Atorvastatin 40 Mg Tablet, 40 mg= 1 tab(s), Oral, Daily  celecoxib 200 mg oral capsule, See Instructions  [place], 12 mg, Intra-Articular, Once  fluticasone NASAL 0.05 mg/inh spray (Flonase), 1 spray(s), Both Nostrils, Daily  hydrochlorothiazide 25 mg oral tablet  hydrochlorothiazide 25 mg oral tablet, 25 mg= 1 tab(s), Oral, Daily,? ?Not taking  lidocaine 2% injectable solution, 2 mL, Intra-Articular, Once  Lipitor  meloxicam 7.5 mg oral tablet, 7.5 mg= 1 tab(s), Oral, Daily  Mobic 7.5 mg oral tablet, 7.5 mg= 1 tab(s), Oral, Daily, 3 refills,? ?Not taking  ZyrTEC, Daily  Allergies  No Known Allergies  No Known Medication Allergies  Social  History  Alcohol - 05/23/2017  Current, Wine, Daily  Employment/School - 05/23/2017  Employed  Home/Environment - 05/23/2017  Lives with Alone. Living situation: Home/Independent.  Substance Abuse - 05/23/2017  Never  Tobacco - 05/23/2017  Never smoker  Family History  Family history is negative

## 2022-05-04 NOTE — HISTORICAL [facility]
This is a historical note converted from Joey. Formatting and pictures may have been removed.  Please reference Joey for original formatting and attached multimedia. Chief Complaint  HERE FOR MRI RESULTS OF LUMBAR SPINE AND LEFT KNEE. HAVING TROUBLE WALKING. C/O PAIN TO LEFT KNEE AND INSTABILITY. RIGHT KNEE PAIN STARTED PAIN THIS WEEKEND. WANTS A CORTISONE INJECTION IN RIGHT KNEE.  History of Present Illness  still with bilateral knee pain and back pain  had MRI and here for results  Review of Systems  Review of Systems  ????Constitutional: No fever, No chills.  ????Respiratory: No shortness of breath, No cough.  ????Cardiovascular: No chest pain.  ????Gastrointestinal: No nausea, No vomiting, No diarrhea, No constipation, No heartburn.  ????Genitourinary: No dysuria, No hematuria.  ????Hematology/Lymphatics: No bleeding tendency.  ????Endocrine: No polyuria.  ????Neurologic: Alert and oriented X4, No numbness, No tingling.  ????Psychiatric: No anxiety, No depression.  Physical Exam  Vitals & Measurements  HR:?80?(Peripheral)? BP:?152/87?  HT:?185?cm? HT:?185?cm? WT:?102.5?kg? WT:?102.5?kg? BMI:?29.95?  ?  ???? Left and right?Knee: ? Examined.? ? Positive effusion.? ?Localized swelling.? ?Genu varum.? ?Patella demonstrated crepitus.? ?Anteromedial aspect was tender on palpation.? ?Medial aspect was tender on palpation.  patella  Left Knee:  Knee Motion:? ?? ?? Value???????????  ? Active flexion????????120 degrees  Active extension????? 8 degrees  ?   left and right knee ?? Pain was elicited throughout the range of motion.? ? Swelling with a?positive fluctuation test.? ? No erythema.? ? No warmth.? ? Anterior aspect was not tender on palpation.? ? Patellofemoral region was not tender on palpation.? ? Medial collateral ligament was not tender on palpation.? ? Lateral collateral ligament was not tender on palpation.? ? No pain was elicited by motion using [place] apprehension test.? ? No laxity of the posterior  cruciate ligament.? ? No anterior drawer sign was present.? ? No one plane medial (straight) instability.? ? No one plane lateral (straight) instability.? ? A Lachman test did not demonstrate one plane anterior instability.? ? Clarkes sign was? observed for chondromalacia.  ?   Right Knee:  Knee Motion:? ?? ? Value? ?? ?? ?? ?? Normal Range  Active flexion??????? 122?degrees  Active extension???? 07?degrees  ?  ?  ?  ?   TESTS  Imaging:  MRI reviewed left knee  degenerative changes  ?   bilateral knee injections with aspiration  Administered corticosteroid injection?? ? 2cc cortisone and 2cc lidocaine using sterile technique after informed verbal consent. Risks discussed with patient prior to injection. Informed the patient of the following:  -?Explained to patient that this injection in some patients will experience increased pain a few minutes after the injection and that the pain will last anywhere from 10 to 20 minutes. In order to decrease the pain you need to do the following:  o?Make sure to move the extremity in which the injection was given. If you do not move the medication sits there and can cause more pain.  o?Ice the affected joint every 2 hours for 20 minutes at a time for the next 24 hours.  o?If you are not already taking an anti-inflammatory take the following Ibuprofen/ Advil take 3 to 4 tablets every 6 to 8 hours or 2 Aleve every 12 hours for the next 5 days to help the medication work. If you cannot take anti-inflammatory take 2 extra strength Tylenol every 6 hours for the next 5 days.  ??Patient voiced understanding ?????????????????????????????????????????????????????????????????????????????  ?  Cardiovascular:  Arterial Pulses: ?? Posterior tibialis pulses were normal. ?? Dorsalis pedis pulses were normal.  Musculoskeletal System:  Lumbar / Lumbosacral Spine:  General/bilateral: ?? Lumbosacral spine exhibited tenderness on palpation. ?? Lumbosacral spine pain was elicited by motion. ??  Lumbosacral spine exhibited no muscle spasms. ?? Lumbosacral spine motion was normal. ?? A straight-leg raising test of the right leg was negative. ?? A straight-leg raising test of the left leg was negative. ?? A modified straight-leg raising test of the right leg was negative (sitting). ?? A modified straight-leg raising test of the left leg was negative (sitting).  Buttocks:  General/bilateral: ?? Tenderness on palpation of the buttocks.  Hips:  General/bilateral: ?? Hip motion was normal.  Knee:  General/bilateral: ?? Knee motion was normal.  Ankle (Motion):  General/bilateral: ?? Ankle motion was normal.  Neurological:  Sensation: ?? No decreased response to tactile stimulation of the entire leg right. ?? No decreased response to tactile stimulation of the entire leg left.  Strength:??????????????Value????Normal Range  Extension strength of the right first -?????????????? 5 out of 5????5 to 5  toe  Extension strength of the left first -?????????????? 5 out of 5????5 to 5  toe  Motor (Strength): ?? No hip weakness was observed. ?? No knee weakness was observed. ?? No ankle weakness was observed.  Gait And Stance: ?? Toe walking was normal.  Reflexes: ?? Knee jerk was normal. ?? Ankle jerk reflex was normal. ?? Flexor response.  Skin:  Injury / Incision Site: ?? No scar lumbar  ?  lumbar MRI reviewed  degenerative changes with stenosis multi-level  ?  ?  ?  ?  will get approval for Synvisc bilateral knees  Assessment/Plan  1.?Osteoarthritis of left knee  2.?DDD (degenerative disc disease), lumbar  3.?Bilateral knee pain   Problem List/Past Medical History  Ongoing  DDD (degenerative disc disease), lumbar  High cholesterol  Osteoarthritis of left knee  Partial tear of right rotator cuff  Historical  Procedure/Surgical History  Arthroscopy of shoulder  Cervical spinal fusion  Decompression laminectomy  History of cervical spine fusion  Hx of knee surgery.  Incision AND drainage  LEFT SHOULDER DECOMPRESSION  LUMBAR  SURGERY  RIGHT KNEE SURGER  Tonsillectomy  Medications  Aspir 81 oral delayed release tablet, 81 mg= 1 tab(s), Oral, Daily  Atorvastatin 40 Mg Tablet, 40 mg= 1 tab(s), Oral, Daily  celecoxib 200 mg oral capsule, See Instructions  fluticasone NASAL 0.05 mg/inh spray (Flonase), 1 spray(s), Both Nostrils, Daily  hydrochlorothiazide 25 mg oral tablet  hydrochlorothiazide 25 mg oral tablet, 25 mg= 1 tab(s), Oral, Daily,? ?Not taking  Lipitor  meloxicam 7.5 mg oral tablet, 7.5 mg= 1 tab(s), Oral, Daily  meloxicam 7.5 mg oral tablet, 7.5 mg= 1 tab(s), Oral, Daily, 2 refills  Mobic 15 mg oral tablet, 15 mg= 1 tab(s), Oral, Daily, 3 refills  Mobic 7.5 mg oral tablet, 7.5 mg= 1 tab(s), Oral, Daily, 3 refills,? ?Not taking  traMADol 50 mg oral tablet, 50 mg= 1 tab(s), Oral, q6hr, PRN  ZyrTEC, Daily  Allergies  No Known Allergies  No Known Medication Allergies  Social History  Alcohol - 05/23/2017  Current, Wine, Daily  Employment/School - 05/23/2017  Employed  Home/Environment - 05/23/2017  Lives with Alone. Living situation: Home/Independent.  Substance Abuse - 05/23/2017  Never  Tobacco - 05/23/2017  Never smoker  Family History  Family history is negative      ?date of visit was 12/12/2017   Patient did have x-rays of his left knee on 12/12/2017  Assessment and these x-rays show severe degenerative changes of the patellofemoral joint which is bone-on-bone along with varus alignment extra articular osteophytes flattening of the femoral condyles and narrowing of medial compartment

## 2022-05-04 NOTE — HISTORICAL [facility]
This is a historical note converted from Joey. Formatting and pictures may have been removed.  Please reference Joey for original formatting and attached multimedia. Chief Complaint  RIGHT SHOULDER PAIN X YEARS OFF AND ON BUT RECENTLY LIFTED UP ON A BATTERY AND SLIPPED ON HIS CARPORT AND C/O BRUISING ON HIS BICEP - STATES HE DID NOT FALL - XRAYS TODAY  History of Present Illness  Pain getting WORST,had a slip 3 weeks ago ?? Pain right Shoulder laterally ?? No fever ?? No chills  ?? Pain in the right shoulder in the subacromial region ?? In the supraspinatus region ?? When actively and passively moved ?? ?? Started gradually ?? Slowly worsens with extended activity ?? Worsens at night ?? Causing an inability to sleep ?? Causes difficulty lying on affected side ?? Feels better after rest ?? Not relieved by medication ?? Shoulder joint stiffness on the right ?? joint stiffness ???? No radicular arm pain ?? No right sternoclavicular joint pain ?? No right shoulder joint swelling ?? No pain in the acromioclavicular  ? ?? No tingling of the right shoulder ?? No right shoulder numbness  ?? Range of motion was abnormal difficulty reaching over head using right arm ?? Range of motion was abnormal difficulty combing hair using right arm ?? Range of motion was abnormal difficulty taking shirt on/off using right arm  ?? No neck pain on right Percervical pain ??? in the trapezius Trapezius pain  Pain is significantly effecting quality of life  Review of Systems  Review of Systems  ????Constitutional: No fever, No chills.  ????Respiratory: No shortness of breath, No cough.  ????Cardiovascular: No chest pain.  ????Gastrointestinal: No nausea, No vomiting, No diarrhea, No constipation, No heartburn.  ????Genitourinary: No dysuria, No hematuria.  ????Hematology/Lymphatics: No bleeding tendency.  ????Endocrine: No polyuria.  ????Neurologic: Alert and oriented X4, No numbness, No tingling.  ????Psychiatric: No anxiety, No  depression.  Physical Exam  Vitals & Measurements  HT:?185.42?cm? HT:?185.42?cm? WT:?102.05?kg? WT:?102.05?kg? BMI:?29.68?  ??????  ???  PHYSICAL FINDING  ??  Neck:  ??NOTED ?Pain radiating to the shoulder Percervical  ???  Musculoskeletal:  ? NO swelling of the right acromioclavicular joint.  ? NO swelling of the left acromioclavicular joint.  ???  General Appearance:  ? In NO acute distress.  ???  Eyes:  General/bilateral:  Sclera: ? Normal.  ???  Ears:  General/bilateral:  Outer Ear: ? Normal.  ???  Lungs:  ? Respiratory excursion normal and symmetric.  ???  Cardiovascular:  Heart Rate and Rhythm: ? Normal.  Arterial Pulses: ? Ulnar pulses 1+ right. ? Radial pulse 1+ right. ? Radial pulse 1+ left.  ???  Abdomen:  ? Normal.  ???  ???  Musculoskeletal System:  ???  Shoulder:  ?.  ?? atrophy of the deltoid muscle  ?? atrophy of the supraspinatus muscle  ? NO atrophy of the infraspinatus muscle  ?   Right Shoulder: ?. ? Acromial tenderness on palpation. ? Tenderness on palpation of the subacromial bursa. ? Tenderness on palpation of the deltoid muscle. ? Tenderness on palpation of the supraspinatus muscle. ? Tenderness on palpation of the infraspinatus muscle. ? Tenderness on palpation of the glenohumeral joint region. ? Tenderness on palpation at the bicipital groove. ? Tenderness on palpation of the trapezius muscle. ? Subacromial crepitus on motion was noted.  ???  Right Shoulder:  ???  Shoulder Motion:??????????????Value???? ????Normal Range  ???  Active abduction??????????????? 100 degrees  Active forward flexion??????????????? 160 degrees  Active external rotation?????????????? 40 degrees  Active internal rotation?????????????? 30 degrees  ???  ? NO swelling medial sternoclavicular.  ? NO swelling.  ? NO induration.  ? NO erythema.  ? NO long head biceps deformity.  ? NO winged scapula.  ? Motion was normal.  ? pain was elicited during a Neer impingement test.  ?NO pain was elicited during a lift-off test of  the shoulder??????????????????????????????????????????????????????????????????????????????  ???????????????????????????????????????????????????????????????????????????????  Clavicle:  ???  General/bilateral: ? Acromioclavicular joint showed NO pain elicited by motion distal right.  ???  Right Clavicle: ? Right sternoclavicular joint showed tenderness on palpation. ? Right acromioclavicular joint showed tenderness on palpation.  ???  Left Clavicle: ? Left sternoclavicular joint showed tenderness on palpation. ? Left acromioclavicular joint showed tenderness on palpation.  ???  ???  Neurological:  ???  ? NO abnormalities were noted Sensibility intact distally on right.  ? Oriented to time, place, and person.  ???  Sensation:  ? NO sensory exam abnormalities were noted Decreased median sensation on right.  ? NO sensory exam abnormalities were noted Decreased ulnar sensation on right.  ? NO sensory exam abnormalities were noted Decreased radial sensation on right.  ???  Motor (Strength):  ?? weakness of the right shoulder was observed with resisted abduction  ?????????????????????????????????????????????????????????????????????????????  ???  TESTS  ???  Imaging:  ???  X-Ray Shoulder:?Complete, two or more views of the true AP of the glenohumeral joint were performed??????????????????????????????????????????????????????????????????????????????????  ?  ?   NO radiographic evidence of any osteoarticular abnormality????????????????????????????????????????????????????????????????????????????????????????????????????????????????????????????????????????????????????????????  ?  ????  ???  ASSESSMENT  ? Partial tear of rotator cuff tendon  ??  ???  PLAN  ?   ? Home exercises  ?   right shoulder injection  Administered corticosteroid injection??2cc cortisone and 2cc lidocaine using sterile technique after informed verbal consent. Risks discussed with patient prior to injection. Informed the patient of the following:  -?Explained  to patient that this injection in some patients will experience increased pain a few minutes after the injection and that the pain will last anywhere from 10 to 20 minutes. In order to decrease the pain you need to do the following:  o?Make sure to move the extremity in which the injection was given. If you do not move the medication sits there and can cause more pain.  o?Ice the affected joint every 2 hours for 20 minutes at a time for the next 24 hours.  o?If you are not already taking an anti-inflammatory take the following Ibuprofen/ Advil take 3 to 4 tablets every 6 to 8 hours or 2 Aleve every 12 hours for the next 5 days to help the medication work. If you cannot take anti-inflammatory take 2 extra strength Tylenol every 6 hours for the next 5 days.  ??Patient voiced understanding ?????????????????????????????????????????????????????????????????????????????  Assessment/Plan  1.?Partial tear of right rotator cuff  Ordered:  betamethasone, 12 mg, Intra-Articular, Once, first dose 05/23/17 15:00:00 CDT, stop date 05/23/17 15:00:00 CDT, 24  celecoxib, 200 mg = 1 cap(s), Oral, Daily, # 30 cap(s), 0 Refill(s), Pharmacy: [facility] [place]  Lidocaine inj., 2 mL, Intra-Articular, Once, first dose 05/23/17 14:43:00 CDT, stop date 05/23/17 14:43:00 CDT  asp/inj jnt/bursa, major [id number] PC, 05/23/17 14:43:00 CDT, [facility], Routine, 05/23/17 14:43:00 CDT  Clinic Follow up, *Est. 06/23/17 3:00:00 CDT, Order for future visit, Partial tear of right rotator cuff, [facility]  Office/Outpatient Visit Level 3 Established [id number] PC, Partial tear of right rotator cuff, [facility], 05/23/17 14:43:00 CDT  ?  Right shoulder pain  ?   Problem List/Past Medical History  High cholesterol  Partial tear of right rotator cuff  Historical  No historical problems  Procedure/Surgical History  Arthroscopy of shoulder, Cervical spinal fusion, Decompression laminectomy, History of cervical spine  fusion, Hx of knee surgery., Incision AND drainage, LEFT SHOULDER DECOMPRESSION, LUMBAR SURGERY, RIGHT KNEE SURGER, Tonsillectomy.  Medications  Aspir 81 oral delayed release tablet, 81 mg, 1 tab(s), Oral, Daily  Atorvastatin 40 Mg Tablet, 40 mg, 1 tab(s), Oral, Daily  CeleBREX 200 mg oral capsule, 200 mg, 1 cap(s), Oral, Daily  Celestone, 12 mg, Intra-Articular, Once  hydrochlorothiazide 25 mg oral tablet, 25 mg, 1 tab(s), Oral, Daily,? ?Not taking  lidocaine 2% injectable solution, 2 mL, Intra-Articular, Once  ZyrTEC, Daily  Allergies  No Known Medication Allergies  Social History  Alcohol  Current, Wine, Daily  Employment/School  Employed  Home/Environment  Lives with Alone. Living situation: Home/Independent.  Substance Abuse  Never  Tobacco  Never smoker

## 2022-05-05 NOTE — HISTORICAL [facility]
This is a historical note converted from Joey. Formatting and pictures may have been removed.  Please reference Joey for original formatting and attached multimedia. Chief Complaint  PATIENT HERE FOR A FOLLOW UP OF HIS MATTIE KNEE. PATIENT WENT TO [place] TO HAVE LEFT PATELLA FEMORAL REPLACEMENT DONE ON 03/22/18.  History of Present Illness  This patients had a left patellofemoral?knee replacement?and has today more complaints of right knee pain apparently after surgery he was having?more pain in his right knee than his left. ?He has a history of having of some pseudogout apparently to aspirate his knee?on at the time of surgery on the right and is increased swelling and pain at that joint.  ?  She presently using with a cane and complains bitterly of his right knee pain.  Review of Systems  Review of Systems  ????Constitutional: No fever, No chills.  ????Respiratory: No shortness of breath, No cough.  ????Cardiovascular: No chest pain.  ????Gastrointestinal: No nausea, No vomiting, No diarrhea, No constipation, No heartburn.  ????Genitourinary: No dysuria, No hematuria.  ????Hematology/Lymphatics: No bleeding tendency.  ????Endocrine: No polyuria.  ????Neurologic: Alert and oriented X4, No numbness, No tingling.  ????Psychiatric: No anxiety, No depression.  Physical Exam  Vitals & Measurements  HT:?185?cm? HT:?185?cm? WT:?89.35?kg? WT:?89.35?kg? BMI:?26.11?  Examination the patients left knee has a healed surgical scar range of motion 0-90? his quad and market atrophy but it is slowly coming back his right knee is locally intense swollen with pain across the patellofemoral joint and some pain posteriorly patient range of motion 0-120??he also has some mild quad atrophy on the right.  ?  X-rays of the left knee reveal an intact?patellofemoral joint replacement the right knee is extensive osteoarthritis with bone-on-bone joint?contact at the patellofemoral joint?also some evidence of chondrocalcinosis?medial  meniscus.  ?  This patients can have an MRI done of his right knee today see the extent of cartilage damage on his patella?for possible patellofemoral joint replacement.  Assessment/Plan  1.?Osteoarthritis of right knee  Ordered:  Clinic Follow up, *Est. 05/10/18 3:00:00 CDT, Order for future visit, Osteoarthritis of right knee  Patella-femoral syndrome, [facility]  Office/Outpatient Visit Level 3 Established [id number] PC, Osteoarthritis of right knee  Patella-femoral syndrome, [facility], 04/10/18 15:17:00 CDT  ?  2.?Patella-femoral syndrome  Ordered:  Clinic Follow up, *Est. 05/10/18 3:00:00 CDT, Order for future visit, Osteoarthritis of right knee  Patella-femoral syndrome, [facility]  Office/Outpatient Visit Level 3 Established [id number] PC, Osteoarthritis of right knee  Patella-femoral syndrome, [facility], 04/10/18 15:17:00 CDT  ?  Left knee pain  Ordered:  XR Knee Left 3 Views, Routine, 04/10/18 14:24:00 CDT, Pain, None, Ambulatory, Rad Type, Left knee pain, 04/10/18 14:24:00 CDT  ?  Right knee pain  Ordered:  XR Knee Right 3 Views, Routine, 04/10/18 14:25:00 CDT, Pain, None, Ambulatory, Rad Type, Right knee pain, 04/10/18 14:25:00 CDT  ?   Problem List/Past Medical History  Ongoing  DDD (degenerative disc disease), lumbar  High cholesterol  Osteoarthritis of left knee  Osteoarthritis of right knee  Partial tear of right rotator cuff  Patella-femoral syndrome  Historical  No qualifying data  Procedure/Surgical History  Colonoscopy (03/14/2017), Arthroscopy of shoulder, Cervical spinal fusion, Decompression laminectomy, History of cervical spine fusion, Hx of knee surgery., Incision AND drainage, LEFT SHOULDER DECOMPRESSION, LUMBAR SURGERY, PATELLA FEMORAL REPLACEMENT, RIGHT KNEE SURGER, Tonsillectomy.  Medications  Aspir 81 oral delayed release tablet, 81 mg= 1 tab(s), Oral, Daily  atorvastatin 20 mg oral tablet, 20 mg= 1 tab(s), Oral, Daily, 3  refills  hydrochlorothiazide 25 mg oral tablet, 25 mg= 1 tab(s), Oral, Daily, 6 refills  meloxicam 7.5 mg oral tablet, 7.5 mg= 1 tab(s), Oral, Daily  OXYCODONE-ACETAMINOPHEN 5-325, 1 tab(s), PRN  traMADol 50 mg oral tablet, 50 mg= 1 tab(s), Oral, q6hr, PRN  zolpidem 10 mg oral tablet, 10 mg= 1 tab(s), Oral, Once a day (at bedtime), PRN  ZyrTEC, Daily  Allergies  No Known Allergies  No Known Medication Allergies  Social History  Alcohol  Past, Wine, Daily, 01/25/2018  Employment/School  Employed, 05/23/2017  Home/Environment  Lives with Alone. Living situation: Home/Independent., 05/23/2017  Substance Abuse  Never, 05/23/2017  Tobacco  Never smoker, 03/16/2018  Family History  Family history is negative

## 2022-05-05 NOTE — HISTORICAL [facility]
This is a historical note converted from Joey. Formatting and pictures may have been removed.  Please reference Joey for original formatting and attached multimedia. Chief Complaint  pt here for left shoulder pain. pt states he feel and hit his shoulder in [place] on ?11/02/2020 ..cv  History of Present Illness  fell 2 weeks ago and landed on his left shoulder  has been having pain since then but it has been improving  This patient is a history of having had a?3 level lumbar fusion done?almost?2 years ago.  ?  He has complaints discomfort in the lower lumbar area is concerned that he may have done something to his instrumentation.  He is not experiencing pain in his legs?he has had bilateral patellofemoral replacements which are doing well.  Review of Systems  Review of Systems?  ?????Constitutional: ?No fever, No chills. ?  ?????Respiratory: ?No shortness of breath, No cough. ?  ?????Cardiovascular: ?No chest pain. ?  ?????Gastrointestinal: ?No nausea, No vomiting, No diarrhea, No constipation, No heartburn. ?  ?????Genitourinary: ?No dysuria, No hematuria. ?  ?????Hematology/Lymphatics: ?No bleeding tendency. ?  ?????Endocrine: ?No polyuria. ?  ?????Neurologic: ?Alert and oriented X4, No numbness, No tingling. ?  ?????Psychiatric: ?No anxiety, No depression. ?  ?????Integumentary: no abnormalities except as noted in history of present illness  Physical Exam  Vitals & Measurements  T:?36.5? ?C (Oral)? HR:?82(Peripheral)? BP:?159/89?  HT:?185.00?cm? WT:?94.800?kg? BMI:?27.7?  ?  Shoulder:  ?.  ??NO atrophy of the deltoid muscle  ??NO atrophy of the supraspinatus muscle  ??NO atrophy of the infraspinatus muscle  ?  ?   Left Shoulder:?? . ? Acromial tenderness on palpation. ? Tenderness on palpation of the subacromial bursa. ? Tenderness on palpation of the deltoid muscle. ? Tenderness on palpation of the supraspinatus muscle. ? Tenderness on palpation of the infraspinatus muscle. ? Tenderness on palpation of the  glenohumeral joint region. ? Tenderness on palpation at the bicipital groove. ? Tenderness on palpation of the trapezius muscle. ? Subacromial crepitus on motion was noted.  TTP AC joint with some prominance??  ????  ??NO swelling medial sternoclavicular.  ??NO swelling.  ??NO induration.  ??NO erythema.  ??NO long head biceps deformity.  ??NO winged scapula.  ??Motion was normal.  ? pain was elicited during a Neer impingement test.  ? pain was elicited during a Fleming-Jamar impingement test.  ??????  Left Shoulder:  ???  Shoulder Motion:??????Normal Range  ???  Active forward flexion????????????????160 degrees  Active external rotation???????????????50 degrees  Active internal rotation???????????????40 degrees??  ???????????????????????????????????????????????????????????????????????????????  Clavicle:  ???  General/bilateral:???Acromioclavicular joint showed? pain elicited by motion distal left.  ?   ???  Neurological:  ???  ? NO abnormalities were noted Sensibility intact distally on right.  ??Oriented to time, place, and person.  ???  ???  Motor (Strength):  ?   ? weakness of the left shoulder was observed.  ??????????????????????????????????????????????????????????????????????????????  Skin:  ??NO ecchymosis on the shoulders left.  ??NO ecchymosis on the shoulders right.  .  Patient is a healed lumbar scar of the lower lumbar spine he has?some restriction in his range of motion.? He is not complaining of any sensory are?motor?dysfunction.  ?   ???  X-rays of his lumbar spine show a solid fusion at?[id number]?but with?marked narrowing?at L2-3  ??????  ???  ASSESSMENT  ?   right AC joint separation (grade 1)  Degenerative lumbar spine arthritis  ???  PLAN  ?   ? Physical therapy service  ? Home exercises  ?NSAIDS  Assessment/Plan  1.?Separation of right acromioclavicular joint, type 1?S43.101A  2.?DDD (degenerative disc disease), lumbar?M51.36  Orders:  XR Shoulder Left Minimum 2 Views, Routine, *Est. 11/19/20  3:00:00 CST, None, Ambulatory, Rad Type, Order for future visit, Left shoulder pain, Not Scheduled, *Est. 11/19/20 3:00:00 CST  XR Spine Lumbar 2 or 3 Views, Routine, 11/19/20 9:19:00 CST, None, Ambulatory, Rad Type, Back pain, Not Scheduled, 11/19/20 9:19:00 CST   Problem List/Past Medical History  Ongoing  DDD (degenerative disc disease), lumbar  High cholesterol  Hypertension  Osteoarthritis of left knee  Osteoarthritis of right knee  Partial tear of right rotator cuff  Patella-femoral syndrome  Pseudogout  Rotator cuff arthropathy of right shoulder  Separation of right acromioclavicular joint, type 1  Historical  No qualifying data  Procedure/Surgical History  Colonoscopy (03/14/2017)  History of cervical spine fusion  LEFT SHOULDER DECOMPRESSION  LUMBAR SURGERY  PATELLA FEMORAL REPLACEMENT  RIGHT KNEE SURGER   Medications  atorvastatin 20 mg oral tablet, See Instructions, 3 refills  folic acid 1 mg oral tablet, 1 mg= 1 tab(s), Oral, Daily  olmesartan 20 mg oral tablet, 20 mg= 1 tab(s), Oral, Daily, 6 refills  ZyrTEC, Daily  Allergies  No Known Allergies  No Known Medication Allergies  Social History  Abuse/Neglect  No, No, Yes, 11/19/2020  Alcohol  Past, Wine, Daily, 01/25/2018  Employment/School  Employed, 05/23/2017  Home/Environment  Lives with Alone. Living situation: Home/Independent., 05/23/2017  Substance Use  Never, 05/23/2017  Tobacco  Never (less than 100 in lifetime), N/A, 11/19/2020  Immunizations  Vaccine Date Status   pneumococcal 13-valent conjugate vaccine 05/28/2020 Given   Health Maintenance  Health Maintenance  ???Pending?(in the next year)  ??? ??OverDue  ??? ? ? ?Tetanus Vaccine due??03/16/19??and every 10??year(s)  ??? ??Due?  ??? ? ? ?Influenza Vaccine due??10/01/20??and every 1??day(s)  ??? ? ? ?Medicare Annual Wellness Exam due??11/19/20??and every 1??year(s)  ??? ? ? ?Zoster Vaccine due??11/19/20??Unknown Frequency  ??? ??Due In Future?  ??? ? ? ?Obesity Screening not due  until??01/01/21??and every 1??year(s)  ??? ? ? ?Advance Directive not due until??01/02/21??and every 1??year(s)  ??? ? ? ?Alcohol Misuse Screening not due until??01/02/21??and every 1??year(s)  ??? ? ? ?Cognitive Screening not due until??01/02/21??and every 1??year(s)  ??? ? ? ?Fall Risk Assessment not due until??01/02/21??and every 1??year(s)  ??? ? ? ?Functional Assessment not due until??01/02/21??and every 1??year(s)  ??? ? ? ?ADL Screening not due until??05/28/21??and every 1??year(s)  ??? ? ? ?Depression Screening not due until??07/27/21??and every 1??year(s)  ??? ? ? ?Hypertension Management-BMP not due until??07/27/21??and every 1??year(s)  ??? ? ? ?Aspirin Therapy for CVD Prevention not due until??07/27/21??and every 1??year(s)  ??? ? ? ?Hypertension Management-Education not due until??07/27/21??and every 1??year(s)  ???Satisfied?(in the past 1 year)  ??? ??Satisfied?  ??? ? ? ?ADL Screening on??05/28/20.??Satisfied by oCnstanza Goodwin  ??? ? ? ?Advance Directive on??07/27/20.??Satisfied by Gayle Diaz LPN P.  ??? ? ? ?Alcohol Misuse Screening on??05/28/20.??Satisfied by Constanza Goodwin  ??? ? ? ?Aspirin Therapy for CVD Prevention on??07/27/20.??Satisfied by Gayle Diaz LPN P.  ??? ? ? ?Blood Pressure Screening on??11/19/20.??Satisfied by Edita Philip LPN  ??? ? ? ?Body Mass Index Check on??11/19/20.??Satisfied by Edita Philip LPN  ??? ? ? ?Cognitive Screening on??07/27/20.??Satisfied by Gayle Diaz LPN  ??? ? ? ?Depression Screening on??07/27/20.??Satisfied by Gayle Diaz LPN  ??? ? ? ?Diabetes Screening on??07/27/20.??Satisfied by Day Atkins  ??? ? ? ?Fall Risk Assessment on??11/19/20.??Satisfied by Edita Philip LPN  ??? ? ? ?Functional Assessment on??07/27/20.??Satisfied by Gayle Diaz LPN  ??? ? ? ?Hypertension Management-Blood Pressure on??11/19/20.??Satisfied by Edita Philip LPN  ??? ? ? ?Hypertension Management-BMP  on??07/27/20.??Satisfied by Day Atkins.  ??? ? ? ?Hypertension Management-Education on??07/27/20.??Satisfied by Gayle Diaz LPN.  ??? ? ? ?Lipid Screening on??05/21/20.??Satisfied by Wiliam Atwood  ??? ? ? ?Obesity Screening on??11/19/20.??Satisfied by Edita Philip LPN  ??? ? ? ?Pneumococcal Vaccine on??05/28/20.??Satisfied by Constanza Goodwin  ?

## 2022-05-05 NOTE — HISTORICAL [facility]
This is a historical note converted from Joey. Formatting and pictures may have been removed.  Please reference Joey for original formatting and attached multimedia. Chief Complaint  PKR 6/28/18-follwing up post op  History of Present Illness  Patient here today for follow-up from patella-femoral partial knee replacement in [place]  Having less pain  No wound drainage  Tolerating PT well  ?  Review of Systems  Constitutional: No fever, No chills.  Respiratory: No shortness of breath, No cough.  Cardiovascular: No chest pain.  Gastrointestinal: No nausea, No vomiting, No diarrhea, No constipation, No heartburn.  Genitourinary: No dysuria, No hematuria.  Hematology/Lymphatics: No bleeding tendency.  Endocrine: No polyuria.  Neurologic: Alert and oriented X4, No numbness, No tingling.  Psychiatric: No anxiety, No depression.  Integumentary: ?negative except as documented in history of present illness  Physical Exam  Vitals & Measurements  BP:?150/92?  HT:?185?cm? WT:?91.17?kg?  right?knee exam  Wound healing well without s/s infection  ROM?0 to 110  Walking with altered gait  Mild swelling to the knee  CMS intact to the?right lower extremity  ???  x-rays show intact prosthesis in good position  ???  staples removed in office today  wound care instructions discussed with patient  ???  Plan:  Continue with therapy and home exercises  Follow-up in one month  ?   patient seen today by Dr Rahman  Assessment/Plan  1.?Status post right partial knee replacement  Ordered:  Clinic Follow up, 08/02/18 9:08:00 CDT, prn, Status post right partial knee replacement, [facility]  Office/Outpatient Visit Level 4 Established [id number] PC, 53, Status post right partial knee replacement, [facility], 08/02/18 9:03:00 CDT  ?   Problem List/Past Medical History  Ongoing  DDD (degenerative disc disease), lumbar  High cholesterol  Osteoarthritis of left knee  Osteoarthritis of right knee  Partial tear of right rotator  cuff  Patella-femoral syndrome  Historical  No qualifying data  Procedure/Surgical History  Colonoscopy (03/14/2017)  Arthroscopy of shoulder  Cervical spinal fusion  Decompression laminectomy  History of cervical spine fusion  Hx of knee surgery  Incision AND drainage  LEFT SHOULDER DECOMPRESSION  LUMBAR SURGERY  PATELLA FEMORAL REPLACEMENT  RIGHT KNEE SURGER  Tonsillectomy   Medications  acetaminophen-oxycodone 325 mg-5 mg oral tablet, 1 tab(s), Oral, q4hr, PRN  atorvastatin 20 mg oral tablet, 20 mg= 1 tab(s), Oral, Daily, 3 refills  Diovan HCT 80 mg-12.5 mg oral tablet, 1 tab(s), Oral, Daily, 3 refills  indomethacin 50 mg oral capsule, 50 mg= 1 cap(s), Oral, TID, PRN  triamcinolone 0.025% topical cream, 1 ray, TOP, BID, PRN  zolpidem 10 mg oral tablet, See Instructions, 1 refills  ZyrTEC, Daily,? ?Still taking, not as prescribed: PRN  Allergies  No Known Allergies  No Known Medication Allergies  Social History  Alcohol  Past, Wine, Daily, 01/25/2018  Employment/School  Employed, 05/23/2017  Home/Environment  Lives with Alone. Living situation: Home/Independent., 05/23/2017  Substance Abuse  Never, 05/23/2017  Tobacco  Never smoker, 03/16/2018  Family History  Family history is negative  Health Maintenance  Health Maintenance  ???Pending?(in the next year)  ??? ??Due?  ??? ? ? ?Aspirin Therapy for CVD Prevention due??08/02/18??and every 1??year(s)  ??? ? ? ?Diabetes Screening due??08/02/18??and every?  ??? ? ? ?Influenza Vaccine due??08/02/18??and every?  ??? ? ? ?Zoster Vaccine due??08/02/18??and every 100??year(s)  ??? ??Postponed?  ??? ? ? ?Tetanus Vaccine due??03/16/19??and every 10??year(s)  ??? ??Due In Future?  ??? ? ? ?Alcohol Misuse Screening not due until??03/16/19??and every 1??year(s)  ??? ? ? ?Hypertension Management-BMP not due until??06/19/19??and every 1??year(s)  ??? ? ? ?Blood Pressure Screening not due until??06/20/19??and every 1??year(s)  ??? ? ? ?Body Mass Index Check not due  until??06/20/19??and every 1??year(s)  ??? ? ? ?Depression Screening not due until??06/20/19??and every 1??year(s)  ??? ? ? ?Hypertension Management-Blood Pressure not due until??06/20/19??and every 1??year(s)  ??? ? ? ?Obesity Screening not due until??06/20/19??and every 1??year(s)  ???Satisfied?(in the past 1 year)  ??? ??Satisfied?  ??? ? ? ?Alcohol Misuse Screening on??03/16/18.??Satisfied by Kathryn Paige LPN  ??? ? ? ?Blood Pressure Screening on??08/02/18.??Satisfied by Zita Keen.  ??? ? ? ?Body Mass Index Check on??06/20/18.??Satisfied by Gayle Diaz LPN P.  ??? ? ? ?Colorectal Screening on??03/14/18.??Satisfied by Kathryn Paige LPN  ??? ? ? ?Depression Screening on??08/02/18.??Satisfied by Zita Keen B.  ??? ? ? ?Diabetes Screening on??06/19/18.??Satisfied by Marina Beatty  ??? ? ? ?Hypertension Management-Blood Pressure on??08/02/18.??Satisfied by Zita Keen B.  ??? ? ? ?Influenza Vaccine on??01/25/18.??Satisfied by Gayle Diaz LPN P.??Reason: Expectation Satisfied Elsewhere  ??? ? ? ?Lipid Screening on??01/05/18.??Satisfied by Wiliam Atwood  ??? ? ? ?Obesity Screening on??06/20/18.??Satisfied by Gayle Diaz LPN P.  ??? ??Postponed?  ??? ? ? ?Tetanus Vaccine until??03/16/18.??Recorded for Kathryn Paige LPN  ?  ?

## 2022-05-05 NOTE — HISTORICAL [facility]
This is a historical note converted from Joey. Formatting and pictures may have been removed.  Please reference Joey for original formatting and attached multimedia. Chief Complaint  Here for lower back pain. Had back surgery 2 years ago. Pt stated the shoulder pain has gotten better.  History of Present Illness  ?? Lower back pain getting worse ?? Lower back pain Frequently (75% of the time)  ?? No changes in urinary habits ?? No leg weakness ?? No difficulty with balance ?? No tingling of the legs ?? No burning sensation in the right leg or foot ?? Not in the left leg or foot ?? No numbness of the right leg ??  Pain is significantly effecting quality of life especially early in the morning when he first gets up his back is stiff.  Review of Systems  Review of Systems?  ?????Constitutional: ?No fever, No chills. ?  ?????Respiratory: ?No shortness of breath, No cough. ?  ?????Cardiovascular: ?No chest pain. ?  ?????Gastrointestinal: ?No nausea, No vomiting, No diarrhea, No constipation, No heartburn. ?  ?????Genitourinary: ?No dysuria, No hematuria. ?  ?????Hematology/Lymphatics: ?No bleeding tendency. ?  ?????Endocrine: ?No polyuria. ?  ?????Neurologic: ?Alert and oriented X4, No numbness, No tingling. ?  ?????Psychiatric: ?No anxiety, No depression. ?  ?????Integumentary: no abnormalities except as noted in history of present illness  Physical Exam  Vitals & Measurements  T:?36.1? ?C (Oral)? HR:?106(Peripheral)? BP:?149/93?  HT:?185.00?cm? WT:?97.520?kg? BMI:?28.49?  Cardiovascular:  Arterial Pulses: ?? Posterior tibialis pulses were normal. ?? Dorsalis pedis pulses were normal.  Musculoskeletal System:  Lumbar / Lumbosacral Spine:  General/bilateral: ?? Lumbosacral spine exhibited tenderness on palpation. ?? Lumbosacral spine pain was elicited by motion. ?? Lumbosacral spine exhibited no muscle spasms. ?? Lumbosacral spine motion was normal. ?? A straight-leg raising test of the right leg was negative. ?? A  straight-leg raising test of the left leg was negative. ?? A modified straight-leg raising test of the right leg was negative (sitting). ?? A modified straight-leg raising test of the left leg was negative (sitting).  Buttocks:  General/bilateral: ?? Tenderness on palpation of the buttocks.  Hips:  General/bilateral: ?? Hip motion was normal.  Knee:  General/bilateral: ?? Knee motion was normal.  Ankle (Motion):  General/bilateral: ?? Ankle motion was normal.  Neurological:  Sensation: ?? No decreased response to tactile stimulation of the entire leg right. ?? No decreased response to tactile stimulation of the entire leg left.  Strength:??????????????Value????Normal Range  Extension strength of the right first -?????????????? 5 out of 5????5 to 5  toe  Extension strength of the left first -?????????????? 5 out of 5????5 to 5  toe  Motor (Strength): ?? No hip weakness was observed. ?? No knee weakness was observed. ?? No ankle weakness was observed.  Gait And Stance: ?? Toe walking was normal.  Reflexes: ?? Knee jerk was normal. ?? Ankle jerk reflex was normal. ?? Flexor response.  Skin:  Injury / Incision Site: ??? scar lumbar  This patient is tenderness at the thoracolumbar junction around the L1-3 area.  ?   Patients x-rays reveal?instrumented fusion with?disc space narrowing at L1-2,L2-3?and some mild?scoliotic deformity,?there is some lucency at the top of the fusion construct at L2 and 3.  ?   Assessment is lumbar disc disease post fusion with breakdown of?adjacent segments.  Assessment/Plan  1.?DDD (degenerative disc disease), lumbar?M51.36  Orders:  XR Spine Lumbar 2 or 3 Views, Routine, 04/13/21 11:54:00 CDT, None, Ambulatory, Rad Type, Lower back pain, Not Scheduled, 04/13/21 11:54:00 CDT   Problem List/Past Medical History  Ongoing  DDD (degenerative disc disease), lumbar  High cholesterol  Hypertension  Osteoarthritis of left knee  Osteoarthritis of right knee  Partial tear of right rotator  cuff  Patella-femoral syndrome  Pseudogout  Rotator cuff arthropathy of right shoulder  Separation of right acromioclavicular joint, type 1  Historical  No qualifying data  Procedure/Surgical History  Colonoscopy (03/14/2017)  History of cervical spine fusion  LEFT SHOULDER DECOMPRESSION  LUMBAR SURGERY  PATELLA FEMORAL REPLACEMENT  RIGHT KNEE SURGER   Medications  atorvastatin 20 mg oral tablet, See Instructions, 3 refills  folic acid 1 mg oral tablet, 1 mg= 1 tab(s), Oral, Daily  olmesartan 20 mg oral tablet, See Instructions  sildenafil 50 mg oral tablet, 50 mg= 1 tab(s), Oral, Daily, PRN, 3 refills  ZyrTEC, Daily  Allergies  No Known Allergies  No Known Medication Allergies  Social History  Abuse/Neglect  No, No, Yes, 04/13/2021  Alcohol  Past, Wine, Daily, 01/25/2018  Employment/School  Employed, 05/23/2017  Home/Environment  Lives with Alone. Living situation: Home/Independent., 05/23/2017  Substance Use  Never, 05/23/2017  Tobacco  Never (less than 100 in lifetime), N/A, 04/13/2021  Immunizations  Vaccine Date Status   tetanus/diphtheria/pertussis, acel(Tdap) 04/12/2021 Given   COVID-19 MRNA, LNP-S, PF- Pfizer 03/04/2021 Given   COVID-19 MRNA, LNP-S, PF- Pfizer 02/10/2021 Given   influenza virus vaccine, inactivated 10/15/2020 Recorded   zoster vaccine, inactivated 06/08/2020 Recorded   pneumococcal 13-valent conjugate vaccine 05/28/2020 Given   influenza virus vaccine, inactivated 09/23/2015 Recorded   influenza virus vaccine, inactivated 10/07/2013 Recorded   Health Maintenance  Health Maintenance  ???Pending?(in the next year)  ??? ??OverDue  ??? ? ? ?Cognitive Screening due??01/02/21??and every 1??year(s)  ??? ??Due?  ??? ? ? ?Medicare Annual Wellness Exam due??04/13/21??and every 1??year(s)  ??? ? ? ?Zoster Vaccine due??04/13/21??Unknown Frequency  ??? ??Due In Future?  ??? ? ? ?ADL Screening not due until??05/28/21??and every 1??year(s)  ??? ? ? ?Aspirin Therapy for CVD Prevention not due  until??07/27/21??and every 1??year(s)  ??? ? ? ?Hypertension Management-Education not due until??07/27/21??and every 1??year(s)  ??? ? ? ?Influenza Vaccine not due until??10/01/21??and every 1??day(s)  ??? ? ? ?Obesity Screening not due until??01/01/22??and every 1??year(s)  ??? ? ? ?Advance Directive not due until??01/02/22??and every 1??year(s)  ??? ? ? ?Alcohol Misuse Screening not due until??01/02/22??and every 1??year(s)  ??? ? ? ?Fall Risk Assessment not due until??01/02/22??and every 1??year(s)  ??? ? ? ?Functional Assessment not due until??01/02/22??and every 1??year(s)  ??? ? ? ?Hypertension Management-BMP not due until??03/01/22??and every 1??year(s)  ??? ? ? ?Blood Pressure Screening not due until??03/03/22??and every 1??year(s)  ??? ? ? ?Body Mass Index Check not due until??03/03/22??and every 1??year(s)  ??? ? ? ?Depression Screening not due until??03/03/22??and every 1??year(s)  ??? ? ? ?Hypertension Management-Blood Pressure not due until??03/03/22??and every 1??year(s)  ???Satisfied?(in the past 1 year)  ??? ??Satisfied?  ??? ? ? ?ADL Screening on??05/28/20.??Satisfied by Constanza Goodwni  ??? ? ? ?Advance Directive on??03/03/21.??Satisfied by SANDRA Hardy, Amanda  ??? ? ? ?Alcohol Misuse Screening on??03/03/21.??Satisfied by SANDRA Hardy, Amanda  ??? ? ? ?Aspirin Therapy for CVD Prevention on??07/27/20.??Satisfied by Gayle Diaz LPN  ??? ? ? ?Blood Pressure Screening on??04/13/21.??Satisfied by Cassie Anthony LPN  ??? ? ? ?Body Mass Index Check on??04/13/21.??Satisfied by Cassie Anthony LPN  ??? ? ? ?Cognitive Screening on??07/27/20.??Satisfied by Gayle Diaz LPN  ??? ? ? ?Depression Screening on??03/03/21.??Satisfied by SANDRA Hardy Phuong  ??? ? ? ?Diabetes Screening on??03/01/21.??Satisfied by Salina Ramsay  ??? ? ? ?Fall Risk Assessment on??04/13/21.??Satisfied by Cassie Anthony LPN  ??? ? ? ?Functional Assessment on??03/03/21.??Satisfied by SANDRA Hardy Phuong  ??? ? ?  ?Hypertension Management-Blood Pressure on??04/13/21.??Satisfied by Cassie Anthony LPN  ??? ? ? ?Hypertension Management-BMP on??03/01/21.??Satisfied by Salina Ramsay  ??? ? ? ?Hypertension Management-Education on??07/27/20.??Satisfied by Gayle Diaz LPN.  ??? ? ? ?Influenza Vaccine on??10/15/20.??Satisfied by SANDRA Hardy Phuong  ??? ? ? ?Lipid Screening on??05/21/20.??Satisfied by Wiliam Atwood  ??? ? ? ?Obesity Screening on??04/13/21.??Satisfied by Cassie Anthony LPN  ??? ? ? ?Pneumococcal Vaccine on??05/28/20.??Satisfied by Constanza Goodwin  ??? ? ? ?Tetanus Vaccine on??04/12/21.??Satisfied by SANDRA Hardy Phuong  ??? ? ? ?Zoster Vaccine on??06/08/20.??Satisfied by Kayla Huber MD  ?

## 2022-05-13 ENCOUNTER — TELEPHONE (OUTPATIENT)
Dept: INTERNAL MEDICINE | Facility: CLINIC | Age: 67
End: 2022-05-13

## 2022-05-16 NOTE — TELEPHONE ENCOUNTER
Based on the medications listed at his last visit in July 2021, there are no medications that he would need to stop.

## 2022-08-18 ENCOUNTER — TELEPHONE (OUTPATIENT)
Dept: INTERNAL MEDICINE | Facility: CLINIC | Age: 67
End: 2022-08-18
Payer: COMMERCIAL

## 2022-08-18 DIAGNOSIS — Z00.00 WELLNESS EXAMINATION: Primary | ICD-10-CM

## 2022-08-18 DIAGNOSIS — E78.5 HYPERLIPIDEMIA, UNSPECIFIED HYPERLIPIDEMIA TYPE: ICD-10-CM

## 2022-08-18 DIAGNOSIS — Z12.5 SCREENING PSA (PROSTATE SPECIFIC ANTIGEN): ICD-10-CM

## 2022-08-18 NOTE — TELEPHONE ENCOUNTER
----- Message from Sherrie Atkins Patient Care Assistant sent at 8/18/2022 10:53 AM CDT -----  Regarding: RE: RONNIE Huber 8/22/22 @0940  Pt. Confirmed appointment and fasting labs.  ----- Message -----  From: Za Prasad LPN  Sent: 8/18/2022  10:14 AM CDT  To: Sherrie Atkins Patient Care Assistant  Subject: RONNIE Huber 8/22/22 @0940                        Are there any outstanding tasks in the chart? Pt will need fasting labs    Is there any documentation of tasks? No    Has patient seen another physician, been to the ER, C, or admitted to hospital since last visit?    Has the patient done blood work or imaging since last visit?

## 2022-08-19 ENCOUNTER — LAB VISIT (OUTPATIENT)
Dept: LAB | Facility: HOSPITAL | Age: 67
End: 2022-08-19
Attending: INTERNAL MEDICINE
Payer: COMMERCIAL

## 2022-08-19 DIAGNOSIS — Z12.5 SCREENING PSA (PROSTATE SPECIFIC ANTIGEN): ICD-10-CM

## 2022-08-19 DIAGNOSIS — E78.5 HYPERLIPIDEMIA, UNSPECIFIED HYPERLIPIDEMIA TYPE: ICD-10-CM

## 2022-08-19 DIAGNOSIS — Z00.00 WELLNESS EXAMINATION: ICD-10-CM

## 2022-08-19 LAB
ABS NEUT (OLG): 3.25 X10(3)/MCL (ref 2.1–9.2)
ALBUMIN SERPL-MCNC: 4.1 GM/DL (ref 3.4–4.8)
ALBUMIN/GLOB SERPL: 1.1 RATIO (ref 1.1–2)
ALP SERPL-CCNC: 107 UNIT/L (ref 40–150)
ALT SERPL-CCNC: 41 UNIT/L (ref 0–55)
AST SERPL-CCNC: 38 UNIT/L (ref 5–34)
BASOPHILS NFR BLD MANUAL: 0.11 X10(3)/MCL (ref 0–0.2)
BASOPHILS NFR BLD MANUAL: 2 %
BILIRUBIN DIRECT+TOT PNL SERPL-MCNC: 0.8 MG/DL
BUN SERPL-MCNC: 19.4 MG/DL (ref 8.4–25.7)
CALCIUM SERPL-MCNC: 10.2 MG/DL (ref 8.8–10)
CHLORIDE SERPL-SCNC: 98 MMOL/L (ref 98–107)
CHOLEST SERPL-MCNC: 160 MG/DL
CHOLEST/HDLC SERPL: 3 {RATIO} (ref 0–5)
CO2 SERPL-SCNC: 23 MMOL/L (ref 23–31)
CREAT SERPL-MCNC: 1.03 MG/DL (ref 0.73–1.18)
EOSINOPHIL NFR BLD MANUAL: 0.71 X10(3)/MCL (ref 0–0.9)
EOSINOPHIL NFR BLD MANUAL: 13 %
ERYTHROCYTE [DISTWIDTH] IN BLOOD BY AUTOMATED COUNT: 12.6 % (ref 11.5–17)
GFR SERPLBLD CREATININE-BSD FMLA CKD-EPI: >60 MLS/MIN/1.73/M2
GLOBULIN SER-MCNC: 3.8 GM/DL (ref 2.4–3.5)
GLUCOSE SERPL-MCNC: 89 MG/DL (ref 82–115)
HCT VFR BLD AUTO: 30.4 % (ref 42–52)
HDLC SERPL-MCNC: 48 MG/DL (ref 35–60)
HGB BLD-MCNC: 10.5 GM/DL (ref 14–18)
IMM GRANULOCYTES # BLD AUTO: 0.02 X10(3)/MCL (ref 0–0.04)
IMM GRANULOCYTES NFR BLD AUTO: 0.4 %
INSTRUMENT WBC (OLG): 5.5 X10(3)/MCL
LDLC SERPL CALC-MCNC: 96 MG/DL (ref 50–140)
LYMPHOCYTES NFR BLD MANUAL: 1.04 X10(3)/MCL
LYMPHOCYTES NFR BLD MANUAL: 19 %
MCH RBC QN AUTO: 32.4 PG (ref 27–31)
MCHC RBC AUTO-ENTMCNC: 34.5 MG/DL (ref 33–36)
MCV RBC AUTO: 93.8 FL (ref 80–94)
MONOCYTES NFR BLD MANUAL: 0.44 X10(3)/MCL (ref 0.1–1.3)
MONOCYTES NFR BLD MANUAL: 8 %
NEUTROPHILS NFR BLD MANUAL: 59 %
NRBC BLD AUTO-RTO: 0 %
PLATELET # BLD AUTO: 291 X10(3)/MCL (ref 130–400)
PLATELET # BLD EST: NORMAL 10*3/UL
PMV BLD AUTO: 10.7 FL (ref 7.4–10.4)
POTASSIUM SERPL-SCNC: 5 MMOL/L (ref 3.5–5.1)
PROT SERPL-MCNC: 7.9 GM/DL (ref 5.8–7.6)
PSA SERPL-MCNC: 1.1 NG/ML
RBC # BLD AUTO: 3.24 X10(6)/MCL (ref 4.7–6.1)
RBC MORPH BLD: NORMAL
SODIUM SERPL-SCNC: 129 MMOL/L (ref 136–145)
TRIGL SERPL-MCNC: 80 MG/DL (ref 34–140)
VLDLC SERPL CALC-MCNC: 16 MG/DL
WBC # SPEC AUTO: 5.5 X10(3)/MCL (ref 4.5–11.5)

## 2022-08-19 PROCEDURE — 85025 COMPLETE CBC W/AUTO DIFF WBC: CPT

## 2022-08-19 PROCEDURE — 84153 ASSAY OF PSA TOTAL: CPT

## 2022-08-19 PROCEDURE — 36415 COLL VENOUS BLD VENIPUNCTURE: CPT

## 2022-08-19 PROCEDURE — 80061 LIPID PANEL: CPT

## 2022-08-19 PROCEDURE — 80053 COMPREHEN METABOLIC PANEL: CPT

## 2022-08-22 ENCOUNTER — OFFICE VISIT (OUTPATIENT)
Dept: INTERNAL MEDICINE | Facility: CLINIC | Age: 67
End: 2022-08-22
Payer: COMMERCIAL

## 2022-08-22 VITALS
DIASTOLIC BLOOD PRESSURE: 60 MMHG | BODY MASS INDEX: 27.96 KG/M2 | HEART RATE: 67 BPM | HEIGHT: 73 IN | SYSTOLIC BLOOD PRESSURE: 100 MMHG | OXYGEN SATURATION: 94 % | WEIGHT: 211 LBS | RESPIRATION RATE: 18 BRPM

## 2022-08-22 DIAGNOSIS — E78.00 HIGH CHOLESTEROL: ICD-10-CM

## 2022-08-22 DIAGNOSIS — I48.91 ATRIAL FIBRILLATION WITH RVR: ICD-10-CM

## 2022-08-22 DIAGNOSIS — R74.01 ELEVATED AST (SGOT): ICD-10-CM

## 2022-08-22 DIAGNOSIS — Z00.00 ENCOUNTER FOR MEDICARE ANNUAL WELLNESS EXAM: ICD-10-CM

## 2022-08-22 DIAGNOSIS — M65.88: ICD-10-CM

## 2022-08-22 DIAGNOSIS — E87.1 HYPONATREMIA: ICD-10-CM

## 2022-08-22 DIAGNOSIS — I10 BENIGN HYPERTENSION: ICD-10-CM

## 2022-08-22 DIAGNOSIS — M51.36 DEGENERATION OF INTERVERTEBRAL DISC OF LUMBAR REGION: ICD-10-CM

## 2022-08-22 PROBLEM — S43.109A DISLOCATION OF ACROMIOCLAVICULAR JOINT: Status: ACTIVE | Noted: 2022-08-22

## 2022-08-22 PROBLEM — M22.2X9 PATELLOFEMORAL PAIN SYNDROME: Status: ACTIVE | Noted: 2022-08-22

## 2022-08-22 PROBLEM — M96.1 POST LAMINECTOMY SYNDROME: Status: ACTIVE | Noted: 2018-12-11

## 2022-08-22 PROBLEM — M11.20 PSEUDOGOUT: Status: ACTIVE | Noted: 2022-08-22

## 2022-08-22 PROBLEM — M75.100 ROTATOR CUFF TEAR: Status: ACTIVE | Noted: 2022-08-22

## 2022-08-22 PROBLEM — N28.9 ABNORMAL KIDNEY FUNCTION: Status: ACTIVE | Noted: 2019-02-05

## 2022-08-22 PROBLEM — M17.9 OA (OSTEOARTHRITIS) OF KNEE: Status: ACTIVE | Noted: 2022-08-22

## 2022-08-22 PROBLEM — J30.2 SEASONAL ALLERGIES: Status: ACTIVE | Noted: 2022-08-22

## 2022-08-22 PROBLEM — L30.9 ECZEMA: Status: ACTIVE | Noted: 2019-02-05

## 2022-08-22 PROBLEM — D53.9 MACROCYTIC ANEMIA: Status: ACTIVE | Noted: 2019-02-05

## 2022-08-22 PROBLEM — G47.00 INSOMNIA: Status: ACTIVE | Noted: 2022-08-22

## 2022-08-22 PROBLEM — M19.90 ARTHRITIS: Status: ACTIVE | Noted: 2022-08-22

## 2022-08-22 PROBLEM — N28.1 CYST OF LEFT KIDNEY: Status: ACTIVE | Noted: 2019-02-05

## 2022-08-22 PROCEDURE — G0439 PR MEDICARE ANNUAL WELLNESS SUBSEQUENT VISIT: ICD-10-PCS | Mod: ,,, | Performed by: INTERNAL MEDICINE

## 2022-08-22 PROCEDURE — 99214 PR OFFICE/OUTPT VISIT, EST, LEVL IV, 30-39 MIN: ICD-10-PCS | Mod: 25,,, | Performed by: INTERNAL MEDICINE

## 2022-08-22 PROCEDURE — 99214 OFFICE O/P EST MOD 30 MIN: CPT | Mod: 25,,, | Performed by: INTERNAL MEDICINE

## 2022-08-22 PROCEDURE — G0439 PPPS, SUBSEQ VISIT: HCPCS | Mod: ,,, | Performed by: INTERNAL MEDICINE

## 2022-08-22 RX ORDER — FOLIC ACID 1 MG/1
1 TABLET ORAL
COMMUNITY
Start: 2021-07-15

## 2022-08-22 RX ORDER — CETIRIZINE HYDROCHLORIDE 10 MG/1
10 TABLET, CHEWABLE ORAL
COMMUNITY

## 2022-08-22 RX ORDER — OLMESARTAN MEDOXOMIL 20 MG/1
20 TABLET ORAL DAILY
COMMUNITY
Start: 2022-05-28 | End: 2022-09-01

## 2022-08-22 RX ORDER — ATORVASTATIN CALCIUM 20 MG/1
TABLET, FILM COATED ORAL
COMMUNITY
Start: 2022-01-21 | End: 2023-01-17

## 2022-08-22 RX ORDER — METOPROLOL TARTRATE 50 MG/1
50 TABLET ORAL 2 TIMES DAILY
COMMUNITY
Start: 2022-06-22

## 2022-08-22 RX ORDER — APIXABAN 5 MG/1
5 TABLET, FILM COATED ORAL 2 TIMES DAILY
COMMUNITY
Start: 2022-06-22

## 2022-08-22 NOTE — PATIENT INSTRUCTIONS
Ulysses Krishnamurthy,     If you are due for any health screening(s) below please notify me so we can arrange them to be ordered and scheduled to maintain your health. Most healthy patients complete it. Don't lose out on improving your health.       Colon Cancer Screening: DUE  Blood Pressure <= 139/89: Yes        Colon Cancer Screening    Of cancers affecting both men and women, colorectal cancer is the third leading cancer killer in the [place]. But it doesnt have to be. Screening can prevent colorectal cancer or find it at an early stage when treatment often leads to a cure.    A colonoscopy is the preferred test for detecting colon cancer. It is needed only once every 10 years if results are negative. While sedated, a flexible, lighted tube with a tiny camera is inserted into the rectum and advanced through the colon to look for cancers. An alternative screening test that is used at home and returned to the lab may also be used. It detects hidden blood in bowel movements which could indicate cancer in the colon. If results are positive, you will need a colonoscopy to determine if the blood is a sign of cancer. This type of follow up (diagnostic) colonoscopy usually requires additional copays as required by your insurance provider. Please contact your PCP if you have any questions.    Although you are still overdue for this important screening, due to the COVID-19 pandemic, we recommend scheduling it in the near future.

## 2022-08-22 NOTE — ASSESSMENT & PLAN NOTE
His BP is a little low.  But he will be off the metoprolol soon, and that should help bring it back to a more acceptable range.

## 2022-08-22 NOTE — ASSESSMENT & PLAN NOTE
The etiology is likley the excessive water intake recently.  His thyroid was normal in May.  And I don't think any of his current meds would cause the issue.  I've recommended he cut back on the water, increase the sodium in his diet and f/u with someone for a repeat sodium level in 2-4 weeks.  I was unable to do any w/u at this time because he is leaving town in 3 hours.  But I did stress the importance of getting this addressed in the very near future.

## 2022-08-22 NOTE — PROGRESS NOTES
Subjective:        Patient Care Team:  Kayla Huber MD as PCP - General (Internal Medicine)     Chief Complaint: Annual Exam (Discuss labs /Had heart ablation in May)      HPI: He is here for a medicare wellness.      Since I saw him last, he's had both knees scoped in [place].  She told him he has RA in the knee and has been recommended to see a rheumatologist, which he hasn't done yet.  The pathology report was reviewed and showed granulomatous synovitis.    During his pre-op for the arthroscopy, they found that his HR was 175.  He went in to see a cardiologist who did an ablation.  And he has an implanted loop recorder, and is on Eliquis and metoprolol .His cardiologist told him to stop metoprolol and eliquis when he completes the current bottle.    He checks his BP periodically, and its been fine.      He has started a diet 2 weeks ago and is drinking over 76 oz of water daily and his sodium intake is low.  He's had the low sodium level in the past, but not in a while.  It was thought to be secondary to hctz in the past.  But he is on olmesartan now.          Problem Noted   Rotator Cuff Tear 8/22/2022   Arthritis 8/22/2022   Benign Hypertension 8/22/2022   Dislocation of Acromioclavicular Joint 8/22/2022   High Cholesterol 8/22/2022   Insomnia 8/22/2022   Oa (Osteoarthritis) of Knee 8/22/2022    Formatting of this note might be different from the original.  Bilat     Patellofemoral Pain Syndrome 8/22/2022   Pseudogout 8/22/2022    seen at time of knee replacement.     Seasonal Allergies 8/22/2022   Atrial Fibrillation With Rvr 8/22/2022    He had an ablation done in [place] in June and has a loop recorder now.  He is on metoprolol and is anticoagulated.     Hyponatremia 8/22/2022   Granulomatous Synovitis 8/22/2022   Encounter for Medicare Annual Wellness Exam 8/22/2022   Abnormal Kidney Function 2/5/2019    Formatting of this note might be different from the original.  BUN/ Cr 32/1.3 pre-op outside labs     Cyst of  Left Kidney 2/5/2019   Eczema 2/5/2019   Elevated Ast (Sgot) 2/5/2019   Macrocytic Anemia 2/5/2019   Post Laminectomy Syndrome 12/11/2018    Formatting of this note might be different from the original.  Added automatically from request for surgery [id number]     Degeneration of Intervertebral Disc of Lumbar Region 1/1/2013        The patient's Health Maintenance was reviewed and the following appears to be due:   Health Maintenance Due   Topic Date Due    Colorectal Cancer Screening  Never done    COVID-19 Vaccine (3 - Booster for Pfizer series) 08/04/2021       Past Medical History:  Past Medical History:   Diagnosis Date    Benign essential HTN     Calcium pyrophosphate deposition disease     Degeneration of lumbar intervertebral disc     Hypercholesterolemia     Osteoarthritis of both knees     Patellofemoral stress syndrome     Unspecified dislocation of unspecified acromioclavicular joint, initial encounter     Unspecified rotator cuff tear or rupture of unspecified shoulder, not specified as traumatic      Past Surgical History:   Procedure Laterality Date    ABLATION      ARTHROSCOPIC DECOMPRESSION OF SHOULDER      CERVICAL SPINE SURGERY      COLONOSCOPY      KNEE ARTHROSCOPY      KNEE RECONSTRUCTION, MEDIAL PATELLAR FEMORAL LIGAMENT      KNEE SURGERY       Review of patient's allergies indicates:  No Known Allergies  Current Outpatient Medications on File Prior to Visit   Medication Sig Dispense Refill    atorvastatin (LIPITOR) 20 MG tablet   See Instructions, TAKE ONE TABLET BY MOUTH ONE TIME DAILY, # 90 tab(s), 3 Refill(s), Pharmacy: [id number], 185, cm, Height/Length Dosing, 07/14/21 9:58:00 CDT, 98.88, kg, Weight Dosing, 07/14/21 9:58:00 CDT      cetirizine 10 mg chewable tablet Take 10 mg by mouth.      ELIQUIS 5 mg Tab Take 5 mg by mouth 2 (two) times daily.      folic acid (FOLVITE) 1 MG tablet Take 1 mg by mouth.      metoprolol tartrate (LOPRESSOR) 50 MG tablet  Take 50 mg by mouth 2 (two) times daily.      olmesartan (BENICAR) 20 MG tablet Take 20 mg by mouth once daily.       No current facility-administered medications on file prior to visit.     Social History     Socioeconomic History    Marital status:    Tobacco Use    Smoking status: Never Smoker    Smokeless tobacco: Never Used   Substance and Sexual Activity    Alcohol use: Not Currently     History reviewed. No pertinent family history.    Review of Systems    Patient Reported Health Risk Assessment  What [age] 65-69  Are you male or female?: Male  During the past four weeks, how much have you been bothered by emotional problems such as feeling anxious, depressed, irritable, sad, or downhearted and blue?: Not at all  During the past five weeks, has your physical and/or emotional health limited your social activities with family, friends, neighbors, or groups?: Not at all  During the past four weeks, how much bodily pain have you generally had?: No pain  During the past four weeks, was someone available to help if you needed and wanted help?: Yes, as much as I wanted  During the past four weeks, what was the hardest physical activity you could do for at least two minutes?: Moderate  Can you get to places out of walking distance without help?  (For example, can you travel alone on buses or taxis, or drive your own car?): Yes  Can you go shopping for groceries or clothes without someone's help?: Yes  Can you prepare your own meals?: Yes  Can you do your own housework without help?: Yes  Because of any health problems, do you need the help of another person with your personal care needs such as eating, bathing, dressing, or getting around the house?: No  Can you handle your own money without help?: Yes  During the past four weeks, how would you rate your health in general?: Good  How have things been going for you during the past four weeks?: Very well  Are you having difficulties driving your  "car?: No  Do you always fasten your seat belt when you are in a car?: Yes, usually  How often in the past four weeks have you been bothered by falling or dizzy when standing up?: Never  How often in the past four weeks have you been bothered by sexual problems?: Never  How often in the past four weeks have you been bothered by trouble eating well?: Never  How often in the past four weeks have you been bothered by teeth or denture problems?: Never  How often in the past four weeks have you been bothered with problems using the telephone?: Never  How often in the past four weeks have you been bothered by tiredness or fatigue?: Never  Have you fallen two or more times in the past year?: No  Are you afraid of falling?: No  Are you a smoker?: No  During the past four weeks, how many drinks of wine, beer, or other alcoholic beverages did you have?: 6-9 drinks per week  Do you exercise for about 20 minutes three or more days a week?: No, I usually do not exercise this much  Have you been given any information to help you with hazards in your house that might hurt you?: Yes  Have you been given any information to help you with keeping track of your medications?: Yes  How often do you have trouble taking medicines the way you've been told to take them?: I always take them as prescribed  How confident are you that you can control and manage most of your health problems?: Very confident  What is your race? (Check all that apply.):     Objective:   /60 (BP Location: Left arm, Patient Position: Sitting, BP Method: Small (Manual))   Pulse 67   Resp 18   Ht 6' 0.84" (1.85 m)   Wt 95.7 kg (211 lb)   SpO2 (!) 94%   BMI 27.96 kg/m²     Physical Exam  Vitals reviewed.   Constitutional:       Appearance: Normal appearance.   HENT:      Head: Normocephalic and atraumatic.   Eyes:      Conjunctiva/sclera: Conjunctivae normal.   Cardiovascular:      Rate and Rhythm: Normal rate and regular rhythm.      Pulses: Normal " pulses.      Heart sounds: Normal heart sounds. No murmur heard.    No friction rub. No gallop.   Pulmonary:      Effort: Pulmonary effort is normal. No respiratory distress.      Breath sounds: Normal breath sounds. No wheezing, rhonchi or rales.   Abdominal:      General: Abdomen is flat. There is no distension.      Palpations: Abdomen is soft. There is no mass.      Tenderness: There is no guarding or rebound.   Skin:     General: Skin is warm and dry.   Neurological:      General: No focal deficit present.      Mental Status: He is alert and oriented to person, place, and time.   Psychiatric:         Mood and Affect: Mood normal.         Behavior: Behavior normal.         Thought Content: Thought content normal.         Judgment: Judgment normal.           No flowsheet data found.  Fall Risk Assessment - Outpatient 8/22/2022   Mobility Status Ambulatory   Number of falls 0   Identified as fall risk 0           Depression Screening  Over the past two weeks, has the patient felt down, depressed, or hopeless?: No  Over the past two weeks, has the patient felt little interest or pleasure in doing things?: No  Functional Ability/Safety Screening  Was the patient's timed Up & Go test unsteady or longer than 30 seconds?: No  Does the patient need help with phone, transportation, shopping, preparing meals, housework, laundry, meds, or managing money?: No  Does the patient's home have rugs in the hallway, lack grab bars in the bathroom, lack handrails on the stairs or have poor lighting?: No  Have you noticed any hearing difficulties?: No  Cognitive Function (Assessed through direct observation with due consideration of information obtained by way of patient reports and/or concerns raised by family, friends, caretakers, or others)    Does the patient repeat questions/statements in the same day?: No  Does the patient have trouble remembering the date, year, and time?: No  Does the patient have difficulty managing  finances?: No  Does the patient have a decreased sense of direction?: No    Assessment and Plan:     Hyponatremia  The etiology is likley the excessive water intake recently.  His thyroid was normal in May.  And I don't think any of his current meds would cause the issue.  I've recommended he cut back on the water, increase the sodium in his diet and f/u with someone for a repeat sodium level in 2-4 weeks.  I was unable to do any w/u at this time because he is leaving town in 3 hours.  But I did stress the importance of getting this addressed in the very near future.    Granulomatous synovitis  His orthopedic surgeon recommended he see a rheumatologist.  I concur and stressed the imporatnce of him getting this done in the very near future to f/u on this finding in conjunction with the pulmonary nodules.  He tells me he had another scan of the pulmonary nodules in [place].  This was compared to the old scans done in the past here and he was told the nodules are unchanged.    Atrial fibrillation with RVR  I recommended he get a cardiologist in [place] where he is living now.    Benign hypertension  His BP is a little low.  But he will be off the metoprolol soon, and that should help bring it back to a more acceptable range.    Elevated AST (SGOT)  This is improved compared to when last checked.    High cholesterol  Cont lipitor.    Encounter for Medicare annual wellness exam  Health Maintenance Due   Topic Date Due    Colorectal Cancer Screening  Never done    COVID-19 Vaccine (3 - Booster for Pfizer series) 08/04/2021       Recent labs reviewed and discussed.      Degeneration of intervertebral disc of lumbar region  I recommended he try Pilates for maintenace therapy on his back.     No follow-ups on file.       [unfilled]  No orders of the defined types were placed in this encounter.        Medicare Annual Wellness and Personalized Prevention Plan:   Fall Risk + Home Safety + Hearing Impairment + Depression Screen +  Cognitive Impairment Screen + Health Risk Assessment all reviewed    No follow-ups on file. In addition to their scheduled follow up, the patient has also been instructed to follow up on as needed basis. I haven't scheduled a f/u because he lives in [place].  But I stressed the importance of getting f/u on the above issues with the appropriate physician in the very near future.

## 2022-08-22 NOTE — ASSESSMENT & PLAN NOTE
Health Maintenance Due   Topic Date Due    Colorectal Cancer Screening  Never done    COVID-19 Vaccine (3 - Booster for Pfizer series) 08/04/2021       Recent labs reviewed and discussed.

## 2022-11-21 PROBLEM — Z00.00 ENCOUNTER FOR MEDICARE ANNUAL WELLNESS EXAM: Status: RESOLVED | Noted: 2022-08-22 | Resolved: 2022-11-21

## 2022-12-10 ENCOUNTER — DOCUMENTATION ONLY (OUTPATIENT)
Dept: PRIMARY CARE CLINIC | Facility: CLINIC | Age: 67
End: 2022-12-10
Payer: COMMERCIAL

## 2022-12-10 LAB — CRC RECOMMENDATION EXT: NORMAL

## 2023-07-12 DIAGNOSIS — I10 BENIGN HYPERTENSION: Primary | ICD-10-CM

## 2023-07-12 RX ORDER — OLMESARTAN MEDOXOMIL 20 MG/1
TABLET ORAL
Qty: 90 TABLET | Refills: 3 | Status: SHIPPED | OUTPATIENT
Start: 2023-07-12 | End: 2023-10-10

## 2023-10-10 DIAGNOSIS — I10 BENIGN HYPERTENSION: ICD-10-CM

## 2023-10-10 DIAGNOSIS — E78.00 HIGH CHOLESTEROL: ICD-10-CM

## 2023-10-10 RX ORDER — OLMESARTAN MEDOXOMIL 20 MG/1
TABLET ORAL
Qty: 90 TABLET | Refills: 3 | Status: SHIPPED | OUTPATIENT
Start: 2023-10-10

## 2023-10-10 RX ORDER — ATORVASTATIN CALCIUM 20 MG/1
TABLET, FILM COATED ORAL
Qty: 90 TABLET | Refills: 3 | Status: SHIPPED | OUTPATIENT
Start: 2023-10-10

## 2025-06-18 DIAGNOSIS — R10.11 ABDOMINAL PAIN, RIGHT UPPER QUADRANT: Primary | ICD-10-CM

## 2025-07-21 NOTE — ASSESSMENT & PLAN NOTE
His orthopedic surgeon recommended he see a rheumatologist.  I concur and stressed the imporatnce of him getting this done in the very near future to f/u on this finding in conjunction with the pulmonary nodules.  He tells me he had another scan of the pulmonary nodules in [place].  This was compared to the old scans done in the past here and he was told the nodules are unchanged.   Detail Level: Zone Detail Level: Detailed